# Patient Record
Sex: FEMALE | Race: WHITE | NOT HISPANIC OR LATINO | Employment: FULL TIME | URBAN - METROPOLITAN AREA
[De-identification: names, ages, dates, MRNs, and addresses within clinical notes are randomized per-mention and may not be internally consistent; named-entity substitution may affect disease eponyms.]

---

## 2017-12-14 ENCOUNTER — HISTORICAL (OUTPATIENT)
Dept: LAB | Facility: HOSPITAL | Age: 27
End: 2017-12-14

## 2017-12-14 LAB
ABS NEUT (OLG): 4.6 X10(3)/MCL (ref 1.5–6.9)
ALBUMIN SERPL-MCNC: 3.8 GM/DL (ref 3.4–5)
ALBUMIN/GLOB SERPL: 1.1 RATIO
ALP SERPL-CCNC: 71 UNIT/L (ref 30–113)
ALT SERPL-CCNC: 22 UNIT/L (ref 10–45)
AST SERPL-CCNC: 15 UNIT/L (ref 15–37)
BASOPHILS # BLD AUTO: 0 X10(3)/MCL (ref 0–0.1)
BASOPHILS NFR BLD AUTO: 0 % (ref 0–1)
BILIRUB SERPL-MCNC: 0.4 MG/DL (ref 0.1–0.9)
BILIRUBIN DIRECT+TOT PNL SERPL-MCNC: 0.1 MG/DL (ref 0–0.3)
BILIRUBIN DIRECT+TOT PNL SERPL-MCNC: 0.3 MG/DL
BUN SERPL-MCNC: 14 MG/DL (ref 10–20)
CALCIUM SERPL-MCNC: 8.9 MG/DL (ref 8–10.5)
CHLORIDE SERPL-SCNC: 104 MMOL/L (ref 100–108)
CHOLEST SERPL-MCNC: 148 MG/DL (ref 140–200)
CHOLEST/HDLC SERPL: 2 MG/DL (ref 0–8)
CO2 SERPL-SCNC: 26 MMOL/L (ref 21–35)
CREAT SERPL-MCNC: 0.64 MG/DL (ref 0.7–1.3)
EOSINOPHIL # BLD AUTO: 0.3 X10(3)/MCL (ref 0–0.6)
EOSINOPHIL NFR BLD AUTO: 4 % (ref 0–5)
ERYTHROCYTE [DISTWIDTH] IN BLOOD BY AUTOMATED COUNT: 12.9 % (ref 11.5–17)
GLOBULIN SER-MCNC: 3.4 GM/DL
GLUCOSE SERPL-MCNC: 89 MG/DL (ref 75–116)
HCT VFR BLD AUTO: 44 % (ref 36–48)
HDLC SERPL-MCNC: 60 MG/DL (ref 35–59)
HGB BLD-MCNC: 15.3 GM/DL (ref 12–16)
LDLC SERPL CALC-MCNC: 90 MG/DL (ref 100–129)
LYMPHOCYTES # BLD AUTO: 1.9 X10(3)/MCL (ref 0.5–4.1)
LYMPHOCYTES NFR BLD AUTO: 24.6 % (ref 15–40)
MAGNESIUM SERPL-MCNC: 1.9 MG/DL (ref 1.8–2.4)
MCH RBC QN AUTO: 33 PG (ref 27–34)
MCHC RBC AUTO-ENTMCNC: 35 GM/DL (ref 31–36)
MCV RBC AUTO: 95 FL (ref 80–99)
MONOCYTES # BLD AUTO: 0.7 X10(3)/MCL (ref 0–1.1)
MONOCYTES NFR BLD AUTO: 9 % (ref 4–12)
NEUTROPHILS # BLD AUTO: 4.6 X10(3)/MCL (ref 1.5–6.9)
NEUTROPHILS NFR BLD AUTO: 61 % (ref 43–75)
PLATELET # BLD AUTO: 243 X10(3)/MCL (ref 140–400)
PMV BLD AUTO: 10.3 FL (ref 6.8–10)
POTASSIUM SERPL-SCNC: 4 MMOL/L (ref 3.6–5.2)
PROT SERPL-MCNC: 7.2 GM/DL (ref 6.4–8.2)
RBC # BLD AUTO: 4.63 X10(6)/MCL (ref 4.2–5.4)
SODIUM SERPL-SCNC: 140 MMOL/L (ref 135–145)
T3FREE SERPL-MCNC: 2.94 PG/ML (ref 2.18–3.98)
TRIGL SERPL-MCNC: 53 MG/DL (ref 35–150)
TSH SERPL-ACNC: 2 MIU/ML (ref 0.36–3.74)
VLDLC SERPL CALC-MCNC: 11 MG/DL
WBC # SPEC AUTO: 7.6 X10(3)/MCL (ref 4.5–11.5)

## 2017-12-21 ENCOUNTER — HISTORICAL (OUTPATIENT)
Dept: RADIOLOGY | Facility: HOSPITAL | Age: 27
End: 2017-12-21

## 2018-02-13 ENCOUNTER — HOSPITAL ENCOUNTER (OUTPATIENT)
Dept: MEDSURG UNIT | Facility: HOSPITAL | Age: 28
End: 2018-02-14
Attending: INTERNAL MEDICINE | Admitting: INTERNAL MEDICINE

## 2018-02-13 LAB
ABS NEUT (OLG): 19.2
BASOPHILS # BLD AUTO: 0.03 X10(3)/MCL
BASOPHILS NFR BLD AUTO: 0.1 %
BUN SERPL-MCNC: 13 MG/DL (ref 7–18)
CALCIUM SERPL-MCNC: 8.4 MG/DL (ref 8.5–10.1)
CHLORIDE SERPL-SCNC: 109 MMOL/L (ref 98–107)
CO2 SERPL-SCNC: 26.9 MMOL/L (ref 21–32)
CREAT SERPL-MCNC: 0.74 MG/DL (ref 0.55–1.02)
EOSINOPHIL # BLD AUTO: 0.02 X10(3)/MCL
EOSINOPHIL NFR BLD AUTO: 0.1 %
ERYTHROCYTE [DISTWIDTH] IN BLOOD BY AUTOMATED COUNT: 13 %
GLUCOSE SERPL-MCNC: 112 MG/DL (ref 74–106)
HCT VFR BLD AUTO: 40.8 % (ref 34–46)
HGB BLD-MCNC: 14.6 GM/DL (ref 11.3–15.4)
IMM GRANULOCYTES # BLD AUTO: 0.1 10*3/UL (ref 0–0.1)
IMM GRANULOCYTES NFR BLD AUTO: 0.4 % (ref 0–1)
LYMPHOCYTES # BLD AUTO: 2.39 X10(3)/MCL
LYMPHOCYTES NFR BLD AUTO: 9.8 %
MAGNESIUM SERPL-MCNC: 2.3 MG/DL (ref 1.8–2.4)
MCH RBC QN AUTO: 33 PG (ref 27–33)
MCHC RBC AUTO-ENTMCNC: 35.8 GM/DL (ref 32–35)
MCV RBC AUTO: 92.1 FL (ref 81–97)
MONOCYTES # BLD AUTO: 2.56 X10(3)/MCL
MONOCYTES NFR BLD AUTO: 10.5 %
NEUTROPHILS # BLD AUTO: 19.2 X10(3)/MCL
NEUTROPHILS NFR BLD AUTO: 79.1 %
PHOSPHATE SERPL-MCNC: 2.6 MG/DL (ref 2.5–4.9)
PLATELET # BLD AUTO: 225 X10(3)/MCL (ref 151–368)
PMV BLD AUTO: 11 FL
POTASSIUM SERPL-SCNC: 3.8 MMOL/L (ref 3.5–5.1)
RBC # BLD AUTO: 4.43 X10(6)/MCL (ref 3.9–5)
SODIUM SERPL-SCNC: 145 MMOL/L (ref 136–145)
WBC # SPEC AUTO: 24.3 X10(3)/MCL (ref 3.4–9.2)

## 2018-02-14 LAB
ABS NEUT (OLG): 8.7
BASOPHILS # BLD AUTO: 0.03 X10(3)/MCL
BASOPHILS NFR BLD AUTO: 0.2 %
BUN SERPL-MCNC: 7 MG/DL (ref 7–18)
CALCIUM SERPL-MCNC: 8 MG/DL (ref 8.5–10.1)
CHLORIDE SERPL-SCNC: 108 MMOL/L (ref 98–107)
CO2 SERPL-SCNC: 25.7 MMOL/L (ref 21–32)
CREAT SERPL-MCNC: 0.68 MG/DL (ref 0.55–1.02)
EOSINOPHIL # BLD AUTO: 0.2 X10(3)/MCL
EOSINOPHIL NFR BLD AUTO: 1.6 %
ERYTHROCYTE [DISTWIDTH] IN BLOOD BY AUTOMATED COUNT: 13 %
GLUCOSE SERPL-MCNC: 89 MG/DL (ref 74–106)
HCT VFR BLD AUTO: 39.4 % (ref 34–46)
HGB BLD-MCNC: 13.7 GM/DL (ref 11.3–15.4)
IMM GRANULOCYTES # BLD AUTO: 0.04 10*3/UL (ref 0–0.1)
IMM GRANULOCYTES NFR BLD AUTO: 0.3 % (ref 0–1)
LYMPHOCYTES # BLD AUTO: 1.92 X10(3)/MCL
LYMPHOCYTES NFR BLD AUTO: 15.6 %
MAGNESIUM SERPL-MCNC: 1.9 MG/DL (ref 1.8–2.4)
MCH RBC QN AUTO: 32.8 PG (ref 27–33)
MCHC RBC AUTO-ENTMCNC: 34.8 GM/DL (ref 32–35)
MCV RBC AUTO: 94.3 FL (ref 81–97)
MONOCYTES # BLD AUTO: 1.39 X10(3)/MCL
MONOCYTES NFR BLD AUTO: 11.3 %
NEUTROPHILS # BLD AUTO: 8.7 X10(3)/MCL
NEUTROPHILS NFR BLD AUTO: 71 %
PLATELET # BLD AUTO: 163 X10(3)/MCL (ref 151–368)
PMV BLD AUTO: 11 FL
POTASSIUM SERPL-SCNC: 3 MMOL/L (ref 3.5–5.1)
RBC # BLD AUTO: 4.18 X10(6)/MCL (ref 3.9–5)
SODIUM SERPL-SCNC: 143 MMOL/L (ref 136–145)
WBC # SPEC AUTO: 12.28 X10(3)/MCL (ref 3.4–9.2)

## 2019-09-21 ENCOUNTER — HOSPITAL ENCOUNTER (OUTPATIENT)
Dept: ONCOLOGY | Facility: HOSPITAL | Age: 29
End: 2019-09-23
Attending: HOSPITALIST | Admitting: COLON & RECTAL SURGERY

## 2020-01-14 ENCOUNTER — HISTORICAL (OUTPATIENT)
Dept: LAB | Facility: HOSPITAL | Age: 30
End: 2020-01-14

## 2020-08-24 ENCOUNTER — HISTORICAL (OUTPATIENT)
Dept: LAB | Facility: HOSPITAL | Age: 30
End: 2020-08-24

## 2020-12-02 LAB
CHLAMYDIA NUCLEIC ACID SCREEN: NEGATIVE
GONOCOCCUS BY NUCLEIC ACID AMP: NEGATIVE
PAP RECOMMENDATION EXT: NORMAL
PAP SMEAR: NORMAL

## 2021-02-05 ENCOUNTER — HISTORICAL (OUTPATIENT)
Dept: LAB | Facility: HOSPITAL | Age: 31
End: 2021-02-05

## 2021-08-06 ENCOUNTER — HISTORICAL (OUTPATIENT)
Dept: ANESTHESIOLOGY | Facility: HOSPITAL | Age: 31
End: 2021-08-06

## 2021-08-27 ENCOUNTER — HISTORICAL (OUTPATIENT)
Dept: LAB | Facility: HOSPITAL | Age: 31
End: 2021-08-27

## 2021-09-15 ENCOUNTER — HISTORICAL (OUTPATIENT)
Dept: SURGERY | Facility: HOSPITAL | Age: 31
End: 2021-09-15

## 2021-10-13 ENCOUNTER — HISTORICAL (OUTPATIENT)
Dept: SURGERY | Facility: HOSPITAL | Age: 31
End: 2021-10-13

## 2021-10-14 ENCOUNTER — HISTORICAL (OUTPATIENT)
Dept: ANESTHESIOLOGY | Facility: HOSPITAL | Age: 31
End: 2021-10-14

## 2022-04-07 ENCOUNTER — HISTORICAL (OUTPATIENT)
Dept: ADMINISTRATIVE | Facility: HOSPITAL | Age: 32
End: 2022-04-07

## 2022-04-23 VITALS
DIASTOLIC BLOOD PRESSURE: 70 MMHG | WEIGHT: 164.88 LBS | OXYGEN SATURATION: 98 % | SYSTOLIC BLOOD PRESSURE: 116 MMHG | HEIGHT: 64 IN | BODY MASS INDEX: 28.15 KG/M2
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
## 2022-04-30 NOTE — DISCHARGE SUMMARY
Patient:   Ashley Clayton             MRN: [id number]            FIN: [id number]               Age:   28 years     Sex:  Female     :  1990   Associated Diagnoses:   Bipolar 1 disorder; GERD (gastroesophageal reflux disease); Methamphetamine use; Tobacco user; Acute hypokalemia; Hypomagnesemia; Intractable vomiting   Author:   Stanley Fernandez MD      Results Review   General results   Most recent results   Discrete results only   2018 5:10 CST       WBC                       12.28 x10(3)/mcL  HI                             RBC                       4.18 x10(6)/mcL                             Hgb                       13.7 gm/dL                             Hct                       39.4 %                             Platelet                  163 x10(3)/mcL                             MCV                       94.3 fL                             MCH                       32.8 pg                             MCHC                      34.8 gm/dL                             RDW                       13  NA                             MPV                       11  NA                             Abs Neut                  8.70  NA                             Neutro Auto               71.0 %  NA                             Lymph Auto                15.6 %  NA                             Mono Auto                 11.3 %  NA                             Eos Auto                  1.6 %  NA                             Abs Eos                   0.20 x10(3)/mcL  NA                             Basophil Auto             0.2 %  NA                             Abs Neutro                8.70 x10(3)/mcL  NA                             Abs Lymph                 1.92 x10(3)/mcL  NA                             Abs Mono                  1.39 x10(3)/mcL  NA                             Abs Baso                  0.03 x10(3)/mcL  NA                             IG%                       0.3 %                             IG#                        0.0400                             Sodium Lvl                143 mmol/L                             Potassium Lvl             3.0 mmol/L  LOW                             Chloride                  108 mmol/L  HI                             CO2                       25.7 mmol/L                             Calcium Lvl               8.0 mg/dL  LOW                             Magnesium Lvl             1.9 mg/dL                             Glucose Lvl               89 mg/dL                             BUN                       7 mg/dL                             Creatinine                0.68 mg/dL                             eGFR-AA                   >60 mL/min/1.73 m2  NA                             eGFR-RAMY                  >60 mL/min/1.73 m2  NA           Discharge Information      Discharge Summary Information   Admitted  2/13/2018   Discharged  2/14/2018   Admitting physician     Delvis AVALOS MD, Arcenio Moya.     Discharge diagnosis     Bipolar 1 disorder (LLS23-RX F31.9).     GERD (gastroesophageal reflux disease) (ODO38-NU K21.9).     Methamphetamine use (ZJG37-WF F15.10).     Tobacco user (JKL33-KP Z72.0).     Discharge medications     OTHER MEDICATIONS (Selected)   Inpatient Medications  Ordered  Carafate: 1 gm, form: Susp, Oral, AC & HS, first dose 02/13/18 3:15:00 CST  Ceftriaxone 1 gm/D5W 50 mL (Premix): 1 gm, form: Infusion, IV Piggyback, q24hr, Infuse over: 30 minute(s), first dose 02/13/18 21:00:00 CST  Chloraseptic sore throat spray: 2 spray(s), form: Spray, TOP, q1hr PRN for sore throat, first dose 02/13/18 16:16:00 CST, **Waste Code BKC**  Normal Saline (0.9% NS) IV 1,000 mL: 1,000 mL, 1,000 mL, IV, 100 mL/hr, start date 02/13/18 4:47:00 CST  PROzac: 40 mg, form: Cap, Oral, Daily, first dose 02/13/18 9:00:00 CST  Protonix: 40 mg, form: Injection, IV Slow, BID, Infuse over: 30 minute(s), first dose 02/13/18 9:00:00 CST  QUEtiapine 200 mg oral tablet: 200 mg, form: Tab, Oral, qPM,  first dose 02/13/18 21:00:00 CST  Zofran 2 mg/mL injectable solution: 2 mg, form: Injection, IV Push, q4hr PRN for nausea, first dose 02/13/18 4:27:00 CST  Documented Medications  Documented  HYDROXYZ HCL 25MG TAB:   PROzac 40 mg oral capsule: 40 mg = 1 cap(s), Oral, Daily, 0 Refill(s)  QUETIAPINE 200MG TAB: 200 mg = 1 tab(s), Oral, qPM  SUMATRIPTAN 50MG TAB: 50 mg = 1 tab(s), Oral, As Indicated.        Physical Examination   General:  Alert and oriented, No acute distress.    Eye:  Pupils are equal, round and reactive to light, Extraocular movements are intact, Normal conjunctiva.    HENT:  Normocephalic, Tympanic membranes are clear.    Neck:  Supple, Non-tender, No carotid bruit, No jugular venous distention, No lymphadenopathy, No thyromegaly.    Respiratory:  Lungs are clear to auscultation, Breath sounds are equal, Symmetrical chest wall expansion, No chest wall tenderness.    Cardiovascular:  Normal rate, Regular rhythm, No murmur, No gallop, No edema.    Gastrointestinal:  Soft, Non-tender, Non-distended, Normal bowel sounds, No organomegaly.    Genitourinary:  No costovertebral angle tenderness.    Vital Signs   2/14/2018 12:59 CST      24 HR Intake Totals       12 mL                             24 HR Output Totals       0 mL                             24 HR I&O Balance         12 mL    2/14/2018 12:00 CST      Temperature Oral          36.5 DegC                             Temperature Oral (calculated)             97.70 DegF                             Peripheral Pulse Rate     62 bpm                             Respiratory Rate          18 br/min                             SpO2                      100 %                             Systolic Blood Pressure   147 mmHg  HI                             Diastolic Blood Pressure  97 mmHg  HI                             Mean Arterial Pressure, Cuff              114 mmHg    2/14/2018 10:00 CST      Respiratory Rate          20 br/min                              SpO2                      99 %    2/14/2018 8:59 CST       24 HR Intake Totals       2 mL                             24 HR Output Totals       0 mL                             24 HR I&O Balance         2 mL    2/14/2018 8:00 CST       Temperature Oral          36.8 DegC                             Temperature Oral (calculated)             98.24 DegF                             Peripheral Pulse Rate     71 bpm                             Respiratory Rate          20 br/min                             SpO2                      99 %                             Systolic Blood Pressure   122 mmHg                             Diastolic Blood Pressure  91 mmHg  HI                             Mean Arterial Pressure, Cuff              101 mmHg    2/14/2018 6:59 CST       24 HR Intake Totals       3,742 mL                             24 HR Output Totals       1,300 mL                             24 HR I&O Balance         2,442 mL    2/14/2018 4:59 CST       24 HR Intake Totals       1,702 mL                             24 HR Output Totals       700 mL                             24 HR I&O Balance         1,002 mL    2/14/2018 4:00 CST       Temperature Oral          36.8 DegC                             Temperature Oral (calculated)             98.24 DegF                             Peripheral Pulse Rate     80 bpm                             Respiratory Rate          20 br/min                             SpO2                      96 %                             Oxygen Therapy            Room air                             Systolic Blood Pressure   111 mmHg                             Diastolic Blood Pressure  74 mmHg                             Mean Arterial Pressure, Cuff              86 mmHg    2/14/2018 2:59 CST       24 HR Intake Totals       1,702 mL                             24 HR Output Totals       700 mL                             24 HR I&O Balance         1,002 mL    2/14/2018 0:59 CST       24 HR Intake  Totals       1,702 mL                             24 HR Output Totals       700 mL                             24 HR I&O Balance         1,002 mL    2/14/2018 0:00 CST       Temperature Oral          37 DegC                             Temperature Oral (calculated)             98.60 DegF                             Peripheral Pulse Rate     88 bpm                             Respiratory Rate          18 br/min                             SpO2                      98 %                             Oxygen Therapy            Room air                             Systolic Blood Pressure   115 mmHg                             Diastolic Blood Pressure  69 mmHg                             Mean Arterial Pressure, Cuff              84 mmHg    2/13/2018 22:59 CST      24 HR Intake Totals       1,702 mL                             24 HR Output Totals       700 mL                             24 HR I&O Balance         1,002 mL    2/13/2018 20:59 CST      24 HR Intake Totals       1,072 mL                             24 HR Output Totals       0 mL                             24 HR I&O Balance         1,072 mL    2/13/2018 20:00 CST      Temperature Oral          37.0 DegC                             Temperature Oral (calculated)             98.60 DegF                             Peripheral Pulse Rate     86 bpm                             Respiratory Rate          20 br/min                             SpO2                      99 %                             Systolic Blood Pressure   110 mmHg                             Diastolic Blood Pressure  69 mmHg                             Mean Arterial Pressure, Cuff              83 mmHg    2/13/2018 15:00 CST      Temperature Oral          36.8 DegC                             Temperature Oral (calculated)             98.24 DegF                             Peripheral Pulse Rate     99 bpm                             Respiratory Rate          20 br/min                             SpO2                       99 %                             Oxygen Therapy            Room air                             Systolic Blood Pressure   114 mmHg                             Diastolic Blood Pressure  77 mmHg    2/13/2018 11:00 CST      Temperature Oral          36.8 DegC                             Temperature Oral (calculated)             98.24 DegF                             Peripheral Pulse Rate     88 bpm                             Respiratory Rate          22 br/min                             SpO2                      100 %                             Oxygen Therapy            Room air                             Systolic Blood Pressure   130 mmHg                             Diastolic Blood Pressure  89 mmHg    2/13/2018 8:00 CST       Temperature Oral          36.9 DegC                             Temperature Oral (calculated)             98.42 DegF                             Peripheral Pulse Rate     95 bpm                             Respiratory Rate          22 br/min                             SpO2                      100 %                             Oxygen Therapy            Room air                             Systolic Blood Pressure   141 mmHg  HI                             Diastolic Blood Pressure  90 mmHg    2/13/2018 4:59 CST       24 HR Intake Totals       110 mL                             24 HR Output Totals       0 mL                             24 HR I&O Balance         110 mL    2/13/2018 4:00 CST       Temperature Oral          37 DegC                             Temperature Oral (calculated)             98.60 DegF                             Peripheral Pulse Rate     98 bpm                             Respiratory Rate          20 br/min                             SpO2                      99 %                             Oxygen Therapy            Room air                             Systolic Blood Pressure   104 mmHg                             Diastolic Blood Pressure  73 mmHg                              Mean Arterial Pressure, Cuff              83 mmHg    2/13/2018 2:59 CST       24 HR Intake Totals       0 mL                             24 HR Output Totals       0 mL                             24 HR I&O Balance         0 mL    2/13/2018 2:14 CST       Temperature Oral          37.2 DegC                             Temperature Oral (calculated)             98.96 DegF                             Peripheral Pulse Rate     95 bpm                             Respiratory Rate          22 br/min                             SpO2                      100 %                             Oxygen Therapy            Room air                             Systolic Blood Pressure   117 mmHg                             Diastolic Blood Pressure  87 mmHg                             Mean Arterial Pressure, Cuff              97 mmHg                             Mean Arterial Pressure, Cuff              97 mmHg    2/13/2018 1:43 CST       Peripheral Pulse Rate     90 bpm                             Respiratory Rate          20 br/min                             SpO2                      100 %                             Systolic Blood Pressure   134 mmHg                             Diastolic Blood Pressure  90 mmHg        Vital Signs (last 24 hrs)_____  Last Charted___________  Temp Oral     36.5 DegC  (FEB 14 12:00)  Heart Rate Peripheral   62 bpm  (FEB 14 12:00)  Resp Rate         18 br/min  (FEB 14 12:00)  SBP      H 147mmHg  (FEB 14 12:00)  DBP      H 97mmHg  (FEB 14 12:00)  SpO2      100 %  (FEB 14 12:00)     Lymphatics:  No lymphadenopathy neck, axilla, groin.    Musculoskeletal:  Normal range of motion, Normal strength, No swelling.    Integumentary:  Warm.    Neurologic:  Alert, Oriented, Normal motor function, No focal deficits, Cranial Nerves II-XII are grossly intact, Normal deep tendon reflexes.    Psychiatric:  Cooperative, Non-suicidal.       Hospital Course   Hospital Course   Admitted from: from  emergency department.     Transferred via: by car.     Admitting diagnosis: Acute hypokalemia (WMI57-PP E87.6), Bipolar 1 disorder (LXO70-LC F31.9), GERD (gastroesophageal reflux disease) (VPE31-JM K21.9), Hypomagnesemia (MNC89-YZ E83.42), Intractable vomiting (OTK00-JK R11.10), Methamphetamine use (FTY92-PH F15.10), Tobacco user (RJN64-PW Z72.0).     Admission disposition: admit to medical bed.       HPI:27yo female presents to the ED c/o N/V of 1 days duration.  Apparently she is coming off synthetic weed an dmay be having some withdrawals.  She states that she also snorted a line of Meth last night which made her feel worse.  She came in and was found to have an elevated WBC of 25.15.  She was afebrile.  Her bp was also accelerated but has been stable since.  No diarrhea.  She is c/o sore throat from vomiting.  CT scan did not show any acute pathology          Hospital course: 29 y/o wf admitted with a dx of polysubstance abuse , nausea and dehydration . She was started on IVF and IVAB .  The urine cx NGTD .  The wbc went down almost to normal level  .  Her sx resolved with previous tx .  She had a PMHx od bipolar  and depression , She states feelin g depress . She was evaluated by a  psych nurse from [facility] psych unit  which  feel if safe to go home and f/u oupt  therapy . She denies any SI/VH/AH . She  does not look dangerous to her selve or others  at this time .  She is willing  to go to  outpt rehab  . Pt was  seen and examined at bedside . She was determined to be suitable for d/c .        D/C summary time :> 35 min       Discharge Plan   Discharge Summary Plan   Discharge Status: improved.     Discharge instructions given: to patient.     Discharge disposition: discharge to home (into the care of family member, self care).     Prescriptions: continue same medications.     Education and Follow-up   Counseled: patient, regarding diagnosis, regarding treatment, regarding medications.     Discharge Planning:  Farhana Osorio In 1 week; Report to Emergency Department if symptoms return or worsen; Anytime the conditions worsen, return to clinic or go to ED; Stanley Lloyd.

## 2022-04-30 NOTE — OP NOTE
ADMITTING DIAGNOSIS:  Left carpal tunnel syndrome.    PROCEDURE:  Left carpal tunnel release.    POSTOPERATIVE DIAGNOSIS:  Successful left carpal tunnel release.    INDICATIONS:  The patient is a 31-year-old  female with bipolar disorder, anxiety, depression, who has hyperhomocysteinemia, along with asthma and hypoglycemia.  Patient recently had carpal tunnel syndrome, both hands.  Left side was worse than the right.  EMG verified the right is greater than left.  She has tried splints and steroids and wanted the left side done initially with the right side to follow.    DESCRIPTION OF PROCEDURE:  The patient was brought to the OR.  Local and IV sedation were given, and patient underwent incision along the palmar crease and exposure of the skin, subcutaneous tissue, and carpal fascia.  The carpal fascia was opened.  The tunnel was released, and the median nerve was completely exposed.  The patient then underwent closure of the skin and subcu with 4-0 plain gut suture and 4-0 nylon.  Sterile dressings were applied with Neosporin.  No problems were encountered.  Tourniquet was let down.  Good hemostasis was achieved.       I appreciate the consultation referral from Ms. Farhana Osorio.        LESLY/BERENICE   DD: 08/06/2021 1152   DT: 08/06/2021 1158  Job # [id number]    cc: Farhana Osorio
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
## 2022-04-30 NOTE — OP NOTE
ADMITTING DIAGNOSIS:  Right carpal tunnel syndrome.    PROCEDURE:  Right carpal tunnel release.    POSTOPERATIVE DIAGNOSIS:  Successful right carpal tunnel release.    BRIEF HISTORY:  Patient is a 31-year-old  female with bipolar disorder, anxiety, depressive disorders, has hyperhomocysteinemia and asthma as well as hyperglycemia.  Patient recently had a carpal tunnel syndrome, both right and left hands.  Left side was done initially.  She did well from it on 08/06/2021.  Now, wants the right hand done.    DESCRIPTION OF PROCEDURE:  She was brought to the OR.  A tourniquet and Esmarch were applied with prepping and draping.  She had local and IV sedation given and then had an incision made along the palmar crease with dissection through skin, subcutaneous tissue, and palmar fascia was opened over the median nerve.  The median nerve was fully exposed with a 15 blade scalpel.  Good hemostasis was achieved with Bovie cautery along the skin edge.  Patient had reapproximation of the skin and subcu with 4-0 nylon and 4-0 plain gut suture.  Sterile dressings were applied.  No problems were encountered.  Patient tolerated procedure well.  Tourniquet was released.  I appreciate the consultation referral from nurse practitioner, Ms. Farhana Osorio and will notify her my findings.        LESLY/BERENICE   DD: 10/14/2021 1331   DT: 10/14/2021 1340  Job # [id number]    cc: Farhana Osorio, Nurse Practitioner
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
## 2022-06-07 DIAGNOSIS — S52.509A CLOSED FRACTURE OF DISTAL END OF RADIUS, UNSPECIFIED FRACTURE MORPHOLOGY, UNSPECIFIED LATERALITY, INITIAL ENCOUNTER: Primary | ICD-10-CM

## 2023-01-31 ENCOUNTER — DOCUMENTATION ONLY (OUTPATIENT)
Dept: ADMINISTRATIVE | Facility: HOSPITAL | Age: 33
End: 2023-01-31
Payer: MEDICAID

## 2023-03-22 ENCOUNTER — LAB VISIT (OUTPATIENT)
Dept: LAB | Facility: HOSPITAL | Age: 33
End: 2023-03-22
Attending: NURSE PRACTITIONER
Payer: MEDICAID

## 2023-03-22 DIAGNOSIS — I51.9 MYXEDEMA HEART DISEASE: ICD-10-CM

## 2023-03-22 DIAGNOSIS — E16.1 IATROGENIC HYPERINSULINISM: Primary | ICD-10-CM

## 2023-03-22 DIAGNOSIS — E03.9 MYXEDEMA HEART DISEASE: ICD-10-CM

## 2023-03-22 LAB
GLUCOSE 1H P 100 G GLC PO SERPL-MCNC: 144 MG/DL (ref 74–100)
GLUCOSE 2H P 100 G GLC PO SERPL-MCNC: 146 MG/DL (ref 74–100)
GLUCOSE 3H P 100 G GLC PO SERPL-MCNC: 88 MG/DL (ref 74–100)
GLUCOSE P FAST SERPL-MCNC: 92 MG/DL (ref 74–100)
T3FREE SERPL-MCNC: 3.23 PG/ML (ref 1.57–3.91)
T4 FREE SERPL-MCNC: 1.05 NG/DL (ref 0.7–1.48)
TSH SERPL-ACNC: 1.17 UIU/ML (ref 0.35–4.94)

## 2023-03-22 PROCEDURE — 36415 COLL VENOUS BLD VENIPUNCTURE: CPT

## 2023-03-22 PROCEDURE — 84443 ASSAY THYROID STIM HORMONE: CPT

## 2023-03-22 PROCEDURE — 84481 FREE ASSAY (FT-3): CPT

## 2023-03-22 PROCEDURE — 82951 GLUCOSE TOLERANCE TEST (GTT): CPT

## 2023-03-22 PROCEDURE — 82947 ASSAY GLUCOSE BLOOD QUANT: CPT

## 2023-03-22 PROCEDURE — 84439 ASSAY OF FREE THYROXINE: CPT

## 2023-03-22 PROCEDURE — 86376 MICROSOMAL ANTIBODY EACH: CPT

## 2023-03-22 PROCEDURE — 82950 GLUCOSE TEST: CPT

## 2023-03-23 LAB
THYROID PEROXIDASE (OLG): NEGATIVE
THYROID PEROXIDASE QUANT (OLG): <4 IU/ML

## 2023-03-26 ENCOUNTER — HOSPITAL ENCOUNTER (EMERGENCY)
Facility: HOSPITAL | Age: 33
Discharge: HOME | End: 2023-03-27
Attending: INTERNAL MEDICINE
Payer: MEDICAID

## 2023-03-26 DIAGNOSIS — T22.111A SUPERFICIAL BURN OF RIGHT FOREARM, INITIAL ENCOUNTER: Primary | ICD-10-CM

## 2023-03-26 PROCEDURE — 99284 EMERGENCY DEPT VISIT MOD MDM: CPT

## 2023-03-26 RX ORDER — IBUPROFEN 400 MG/1
800 TABLET ORAL
Status: COMPLETED | OUTPATIENT
Start: 2023-03-27 | End: 2023-03-26

## 2023-03-26 RX ORDER — HYDROCODONE BITARTRATE AND ACETAMINOPHEN 5; 325 MG/1; MG/1
1 TABLET ORAL EVERY 6 HOURS PRN
Qty: 12 TABLET | Refills: 0 | Status: SHIPPED | OUTPATIENT
Start: 2023-03-26 | End: 2023-03-29

## 2023-03-26 RX ORDER — SILVER SULFADIAZINE 10 G/1000G
1 CREAM TOPICAL
Status: COMPLETED | OUTPATIENT
Start: 2023-03-27 | End: 2023-03-26

## 2023-03-26 RX ORDER — SILVER SULFADIAZINE 10 G/1000G
CREAM TOPICAL 2 TIMES DAILY
Qty: 400 G | Refills: 0 | Status: SHIPPED | OUTPATIENT
Start: 2023-03-26 | End: 2023-04-09

## 2023-03-26 RX ADMIN — SILVER SULFADIAZINE 1 TUBE: 10 CREAM TOPICAL at 11:03

## 2023-03-26 RX ADMIN — IBUPROFEN 800 MG: 400 TABLET, FILM COATED ORAL at 11:03

## 2023-03-27 VITALS
DIASTOLIC BLOOD PRESSURE: 63 MMHG | SYSTOLIC BLOOD PRESSURE: 120 MMHG | WEIGHT: 184.75 LBS | BODY MASS INDEX: 32.73 KG/M2 | OXYGEN SATURATION: 97 % | HEIGHT: 63 IN | HEART RATE: 98 BPM | TEMPERATURE: 98 F | RESPIRATION RATE: 18 BRPM

## 2023-03-27 PROCEDURE — 25000003 PHARM REV CODE 250: Performed by: NURSE PRACTITIONER
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
## 2023-05-03 ENCOUNTER — HOSPITAL ENCOUNTER (EMERGENCY)
Facility: HOSPITAL | Age: 33
Discharge: HOME | End: 2023-05-03
Attending: EMERGENCY MEDICINE
Payer: MEDICAID

## 2023-05-03 VITALS
BODY MASS INDEX: 30.56 KG/M2 | HEIGHT: 64 IN | DIASTOLIC BLOOD PRESSURE: 79 MMHG | RESPIRATION RATE: 19 BRPM | SYSTOLIC BLOOD PRESSURE: 118 MMHG | OXYGEN SATURATION: 99 % | WEIGHT: 179 LBS | TEMPERATURE: 98 F | HEART RATE: 110 BPM

## 2023-05-03 DIAGNOSIS — R19.7 NAUSEA VOMITING AND DIARRHEA: Primary | ICD-10-CM

## 2023-05-03 DIAGNOSIS — E86.0 DEHYDRATION, MODERATE: ICD-10-CM

## 2023-05-03 DIAGNOSIS — R11.2 NAUSEA VOMITING AND DIARRHEA: Primary | ICD-10-CM

## 2023-05-03 DIAGNOSIS — F19.10 POLYSUBSTANCE ABUSE: ICD-10-CM

## 2023-05-03 DIAGNOSIS — F31.9 BIPOLAR AFFECTIVE DISORDER, REMISSION STATUS UNSPECIFIED: ICD-10-CM

## 2023-05-03 LAB
ALBUMIN SERPL-MCNC: 3.7 G/DL (ref 3.5–5)
ALBUMIN/GLOB SERPL: 1.5 RATIO (ref 1.1–2)
ALP SERPL-CCNC: 60 UNIT/L (ref 40–150)
ALT SERPL-CCNC: 20 UNIT/L (ref 0–55)
APPEARANCE UR: CLEAR
AST SERPL-CCNC: 15 UNIT/L (ref 5–34)
B-HCG SERPL QL: NEGATIVE
BACTERIA #/AREA URNS AUTO: ABNORMAL /HPF
BASOPHILS # BLD AUTO: 0.03 X10(3)/MCL
BASOPHILS NFR BLD AUTO: 0.2 %
BILIRUB UR QL STRIP.AUTO: NEGATIVE MG/DL
BILIRUBIN DIRECT+TOT PNL SERPL-MCNC: 0.8 MG/DL
BUN SERPL-MCNC: 18 MG/DL (ref 7–18.7)
CALCIUM SERPL-MCNC: 8.8 MG/DL (ref 8.4–10.2)
CHLORIDE SERPL-SCNC: 99 MMOL/L (ref 98–107)
CO2 SERPL-SCNC: 23 MMOL/L (ref 22–29)
COLOR UR AUTO: YELLOW
CREAT SERPL-MCNC: 0.83 MG/DL (ref 0.55–1.02)
EOSINOPHIL # BLD AUTO: 0.04 X10(3)/MCL (ref 0–0.9)
EOSINOPHIL NFR BLD AUTO: 0.3 %
ERYTHROCYTE [DISTWIDTH] IN BLOOD BY AUTOMATED COUNT: 12.4 % (ref 11.5–17)
GFR SERPLBLD CREATININE-BSD FMLA CKD-EPI: >60 MLS/MIN/1.73/M2
GLOBULIN SER-MCNC: 2.5 GM/DL (ref 2.4–3.5)
GLUCOSE SERPL-MCNC: 150 MG/DL (ref 74–100)
GLUCOSE UR QL STRIP.AUTO: NEGATIVE MG/DL
HCT VFR BLD AUTO: 49.7 % (ref 37–47)
HGB BLD-MCNC: 17.4 G/DL (ref 12–16)
IMM GRANULOCYTES # BLD AUTO: 0.06 X10(3)/MCL (ref 0–0.04)
IMM GRANULOCYTES NFR BLD AUTO: 0.5 %
KETONES UR QL STRIP.AUTO: NEGATIVE MG/DL
LEUKOCYTE ESTERASE UR QL STRIP.AUTO: NEGATIVE UNIT/L
LIPASE SERPL-CCNC: 7 U/L
LYMPHOCYTES # BLD AUTO: 0.49 X10(3)/MCL (ref 0.6–4.6)
LYMPHOCYTES NFR BLD AUTO: 3.8 %
MCH RBC QN AUTO: 33.7 PG (ref 27–31)
MCHC RBC AUTO-ENTMCNC: 35 G/DL (ref 33–36)
MCV RBC AUTO: 96.3 FL (ref 80–94)
MONOCYTES # BLD AUTO: 0.55 X10(3)/MCL (ref 0.1–1.3)
MONOCYTES NFR BLD AUTO: 4.2 %
NEUTROPHILS # BLD AUTO: 11.82 X10(3)/MCL (ref 2.1–9.2)
NEUTROPHILS NFR BLD AUTO: 91 %
NITRITE UR QL STRIP.AUTO: NEGATIVE
PH UR STRIP.AUTO: 5.5 [PH]
PLATELET # BLD AUTO: 191 X10(3)/MCL (ref 130–400)
PMV BLD AUTO: 10.6 FL (ref 7.4–10.4)
POCT GLUCOSE: 195 MG/DL (ref 70–110)
POTASSIUM SERPL-SCNC: 3.5 MMOL/L (ref 3.5–5.1)
PROT SERPL-MCNC: 6.2 GM/DL (ref 6.4–8.3)
PROT UR QL STRIP.AUTO: NEGATIVE MG/DL
RBC # BLD AUTO: 5.16 X10(6)/MCL (ref 4.2–5.4)
RBC #/AREA URNS AUTO: ABNORMAL /HPF
RBC UR QL AUTO: ABNORMAL UNIT/L
SODIUM SERPL-SCNC: 131 MMOL/L (ref 136–145)
SP GR UR STRIP.AUTO: >=1.03
SQUAMOUS #/AREA URNS AUTO: ABNORMAL /HPF
UROBILINOGEN UR STRIP-ACNC: 0.2 MG/DL
WBC # SPEC AUTO: 12.99 X10(3)/MCL (ref 4.5–11.5)
WBC #/AREA URNS AUTO: ABNORMAL /HPF

## 2023-05-03 PROCEDURE — 63600175 PHARM REV CODE 636 W HCPCS: Performed by: EMERGENCY MEDICINE

## 2023-05-03 PROCEDURE — 85025 COMPLETE CBC W/AUTO DIFF WBC: CPT | Performed by: EMERGENCY MEDICINE

## 2023-05-03 PROCEDURE — 82962 GLUCOSE BLOOD TEST: CPT

## 2023-05-03 PROCEDURE — 80053 COMPREHEN METABOLIC PANEL: CPT | Performed by: EMERGENCY MEDICINE

## 2023-05-03 PROCEDURE — 25000003 PHARM REV CODE 250: Performed by: EMERGENCY MEDICINE

## 2023-05-03 PROCEDURE — 96361 HYDRATE IV INFUSION ADD-ON: CPT

## 2023-05-03 PROCEDURE — 96374 THER/PROPH/DIAG INJ IV PUSH: CPT

## 2023-05-03 PROCEDURE — 99284 EMERGENCY DEPT VISIT MOD MDM: CPT

## 2023-05-03 PROCEDURE — 81001 URINALYSIS AUTO W/SCOPE: CPT | Performed by: EMERGENCY MEDICINE

## 2023-05-03 PROCEDURE — 81025 URINE PREGNANCY TEST: CPT | Performed by: EMERGENCY MEDICINE

## 2023-05-03 PROCEDURE — 83690 ASSAY OF LIPASE: CPT | Performed by: EMERGENCY MEDICINE

## 2023-05-03 RX ORDER — ONDANSETRON 2 MG/ML
4 INJECTION INTRAMUSCULAR; INTRAVENOUS
Status: COMPLETED | OUTPATIENT
Start: 2023-05-03 | End: 2023-05-03

## 2023-05-03 RX ORDER — ONDANSETRON 4 MG/1
4 TABLET, ORALLY DISINTEGRATING ORAL EVERY 6 HOURS PRN
Qty: 16 TABLET | Refills: 0 | Status: SHIPPED | OUTPATIENT
Start: 2023-05-03

## 2023-05-03 RX ADMIN — SODIUM CHLORIDE, POTASSIUM CHLORIDE, SODIUM LACTATE AND CALCIUM CHLORIDE 1000 ML: 600; 310; 30; 20 INJECTION, SOLUTION INTRAVENOUS at 09:05

## 2023-05-03 RX ADMIN — ONDANSETRON 4 MG: 2 INJECTION INTRAMUSCULAR; INTRAVENOUS at 08:05

## 2023-05-03 RX ADMIN — SODIUM CHLORIDE 2000 ML: 9 INJECTION, SOLUTION INTRAVENOUS at 08:05

## 2023-05-03 NOTE — ED PROVIDER NOTES
Encounter Date: 5/3/2023       History     Chief Complaint   Patient presents with    Abdominal Pain     Pt c/o vomiting and diarrhea with body aches since last night.     Patient complains of sudden onset of midnight of nausea vomiting and diarrhea no coffee-ground emesis no black tarry stool nothing that looks bloody coming out of either end she states.  No severe pain with it.  Lightheaded and dizzy but no syncope.  Past medical history is significant for ongoing tobacco use.  She is a regular methamphetamine user.  In last night she said she slipped up and did some crack cocaine.  She does not drink alcohol.  She has a history of bipolar disorder gastroesophageal reflux disease diabetes mellitus on metformin and hypothyroidism.  She denies any pain associated with this event this morning.  No chest pain no abdominal pain.  She denies passing out.      She is had her COVID shots she is had a flu shot in  she is had tetanus shot in 2019 she is not had any pneumonia vaccines.      Past surgical history cholecystectomy tonsillectomy adenoidectomy 1 surgery for ruptured ectopic pregnancy.      She is not had a cycle in 3 years she was on Depo-Provera shots her last injection was 6 months ago she has not started bleeding yet.  Her OBGYN doctors none current lead.  Farhanabal Osorio is her primary healthcare provider.   1 para 0 ectopic pregnancy 1.  She is employed she is single and lives alone.    Her mother is  with heart disease.  Her dad is alive he has diverticulitis.    Review of patient's allergies indicates:   Allergen Reactions    Latex, natural rubber Rash     Past Medical History:   Diagnosis Date    Diabetes mellitus      History reviewed. No pertinent surgical history.  History reviewed. No pertinent family history.     Review of Systems   Constitutional:  Positive for chills.   HENT: Negative.     Eyes: Negative.    Respiratory: Negative.     Cardiovascular: Negative.    Gastrointestinal:   Positive for diarrhea, nausea and vomiting (Onset midnight). Negative for anal bleeding.   Endocrine: Negative.    Genitourinary: Negative.    Musculoskeletal: Negative.    Skin: Negative.    Allergic/Immunologic: Negative.    Neurological: Negative.    Hematological: Negative.    Psychiatric/Behavioral:          Methamphetamine abuse on a regular basis cocaine crack use last night.  Chronic bipolar disorder.   All other systems reviewed and are negative.    Physical Exam     Initial Vitals [05/03/23 0800]   BP Pulse Resp Temp SpO2   114/81 (!) 130 18 98.4 °F (36.9 °C) 95 %      MAP       --         Physical Exam    Nursing note and vitals reviewed.  Constitutional: She appears well-developed and well-nourished.   A lady who looks concerned but is not toxic appearing she is fully awake and oriented tachycardia is noted   HENT:   Head: Normocephalic and atraumatic.   Right Ear: Tympanic membrane and external ear normal.   Left Ear: Tympanic membrane and external ear normal.   Nose: Nose normal.   Mouth/Throat: Oropharynx is clear and moist and mucous membranes are normal.   Eyes: EOM are normal. Pupils are equal, round, and reactive to light.   Neck: Neck supple. No thyromegaly present. No tracheal deviation present. No JVD present.   Normal range of motion.  Cardiovascular:  Normal rate, regular rhythm and normal heart sounds.     Exam reveals no gallop and no friction rub.       No murmur heard.  Pulmonary/Chest: Breath sounds normal. No stridor. No respiratory distress. She has no wheezes. She has no rhonchi. She has no rales. She exhibits no tenderness.   Abdominal: Abdomen is soft. Bowel sounds are normal. She exhibits no distension and no mass. There is no abdominal tenderness.   Good bowel sounds all quadrants no masses no rebound 2+ femoral pulses   Musculoskeletal:         General: No tenderness or edema. Normal range of motion.      Cervical back: Normal range of motion and neck supple.     Lymphadenopathy:      She has no cervical adenopathy.   Neurological: She is alert and oriented to person, place, and time. She has normal strength and normal reflexes. No cranial nerve deficit. GCS score is 15. GCS eye subscore is 4. GCS verbal subscore is 5. GCS motor subscore is 6.   Skin: Skin is warm and dry. Capillary refill takes 2 to 3 seconds. No rash noted.   Psychiatric: She has a normal mood and affect. Her behavior is normal. Judgment and thought content normal.   Substance abuse methamphetamines on a regular basis and cocaine crack type last night.       ED Course   Procedures  Labs Reviewed   COMPREHENSIVE METABOLIC PANEL - Abnormal; Notable for the following components:       Result Value    Sodium Level 131 (*)     Glucose Level 150 (*)     Protein Total 6.2 (*)     All other components within normal limits   URINALYSIS, REFLEX TO URINE CULTURE - Abnormal; Notable for the following components:    Blood, UA Trace-Intact (*)     All other components within normal limits   CBC WITH DIFFERENTIAL - Abnormal; Notable for the following components:    WBC 12.99 (*)     Hgb 17.4 (*)     Hct 49.7 (*)     MCV 96.3 (*)     MCH 33.7 (*)     MPV 10.6 (*)     Lymph # 0.49 (*)     Neut # 11.82 (*)     IG# 0.06 (*)     All other components within normal limits   URINALYSIS, MICROSCOPIC - Abnormal; Notable for the following components:    Bacteria, UA Few (*)     Squamous Epithelial Cells, UA Few (*)     All other components within normal limits   POCT GLUCOSE - Abnormal; Notable for the following components:    POCT Glucose 195 (*)     All other components within normal limits   LIPASE - Normal   HCG QUALITATIVE URINE - Normal   CBC W/ AUTO DIFFERENTIAL    Narrative:     The following orders were created for panel order CBC W/ AUTO DIFFERENTIAL.  Procedure                               Abnormality         Status                     ---------                               -----------         ------                     CBC with  Differential[[id number]]        Abnormal            Final result                 Please view results for these tests on the individual orders.          Imaging Results    None          Medications   lactated ringers bolus 1,000 mL (has no administration in time range)   sodium chloride 0.9% bolus 2,000 mL 2,000 mL (2,000 mLs Intravenous New Bag 5/3/23 0828)   ondansetron injection 4 mg (4 mg Intravenous Given 5/3/23 0829)     Medical Decision Making:   Initial Assessment:   Nausea vomiting diarrhea onset at midnight chronic methamphetamine abuse.  Crack cocaine use last night.  Bipolar disorder.  Hypothyroidism diabetes.  Gastroesophageal reflux disease.  Pleasant awake oriented good historian normocephalic  Atraumatic mucous membranes still moist heart is tachycardic without murmur gallop or rub  Lungs are clear to auscultation and percussion  Abdomen is benign with good bowel sounds no masses no rebound nontender abdomen  Psych she is appropriate nonsuicidal non homicidal neurological is appropriate no focal deficits  Differential Diagnosis:   Gastroenteritis with nausea vomiting and diarrhea, polysubstance abuse with secondary side effects, dehydration, mild, bipolar disorder,  MDM  Problems addressed  Co-morbidities and/or factors adding to the complexity or risk for the patient:  Polysubstance abuse bipolar disorder diabetes mellitus hypothyroidism  Problems addressed:  Nausea vomiting diarrhea onset 8 hours prior to arrival  Acute problem/illness or progression/exacerbation of chronic problem with potential threat to life/bodily dysfunction?:  Drug abuse  Differential diagnoses/problems considered: see above     Amount and/or Complexity of Data Reviewed  Independent Historian: none (see above for summary)  External Data Reviewed: notes from previous ED visits and notes from clinic visits (see above for summary)  Risk and benefits of testing: discussed   Labs: Labs: ordered and reviewed  Radiology:Radiology:  ordered and independent interpretation performed (see above or ED course)  ECG/medicine tests:Radiology: ordered and independent interpretation performed (see above or ED course)  none    Risk  Parenteral controlled substances   Diagnosis or treatment significantly impacted by social determinants of health: psychiatric illness and substance abuse  Shared decision making     Critical Care  none            ED Course as of 05/03/23 0936   Wed May 03, 2023   0929 Patient said she is feeling better.  We talked about dehydration we talked about a work release for today and tomorrow.  She can go back on Friday if she is feeling okay and keeping liquids down if not she needs to follow up with her regular healthcare provider miss Farhana Osorio.  She understands this 1/3 L will be hung shortly her aunt sitting at the bedside voices her understanding also [DM]      ED Course User Index  [DM] Darryl Marroquin MD                 Clinical Impression:   Final diagnoses:  [R11.2, R19.7] Nausea vomiting and diarrhea (Primary)  [E86.0] Dehydration, moderate  [F19.10] Polysubstance abuse  [F31.9] Bipolar affective disorder, remission status unspecified        ED Disposition Condition    Discharge Stable          ED Prescriptions       Medication Sig Dispense Start Date End Date Auth. Provider    ondansetron (ZOFRAN-ODT) 4 MG TbDL Take 1 tablet (4 mg total) by mouth every 6 (six) hours as needed. 16 tablet 5/3/2023 -- Darryl Marroquin MD          Follow-up Information       Follow up With Specialties Details Why Contact Info    Farhana Osorio, GERTRUDE Family Medicine In 2 days As needed [address] [facility] [place] [address]  [phone]               Darryl Marroquin MD  05/03/23 0985

## 2023-05-03 NOTE — DISCHARGE INSTRUCTIONS
Please seek care for any drug issues.    Continue your current prescription medications    Return to work or school on Friday    Return for any emergency problem

## 2023-05-03 NOTE — Clinical Note
"Ashley Lamasfatmata Clayton was seen and treated in our emergency department on 5/3/2023.  She may return to work on 05/05/2023.       If you have any questions or concerns, please don't hesitate to call.      Darryl Marroquin MD"

## 2023-06-23 ENCOUNTER — LAB VISIT (OUTPATIENT)
Dept: LAB | Facility: HOSPITAL | Age: 33
End: 2023-06-23
Attending: CLINICAL NURSE SPECIALIST
Payer: MEDICAID

## 2023-06-23 DIAGNOSIS — Z79.899 ENCOUNTER FOR LONG-TERM (CURRENT) USE OF OTHER MEDICATIONS: Primary | ICD-10-CM

## 2023-06-23 LAB
ALBUMIN SERPL-MCNC: 4.1 G/DL (ref 3.4–5)
ALBUMIN/GLOB SERPL: 1.8 RATIO
ALP SERPL-CCNC: 93 UNIT/L (ref 50–144)
ALT SERPL-CCNC: 21 UNIT/L (ref 1–45)
ANION GAP SERPL CALC-SCNC: 4 MEQ/L (ref 2–13)
AST SERPL-CCNC: 21 UNIT/L (ref 14–36)
BASOPHILS # BLD AUTO: 0.05 X10(3)/MCL (ref 0.01–0.08)
BASOPHILS NFR BLD AUTO: 0.6 % (ref 0.1–1.2)
BILIRUBIN DIRECT+TOT PNL SERPL-MCNC: 0.3 MG/DL (ref 0–1)
BUN SERPL-MCNC: 14 MG/DL (ref 7–20)
CALCIUM SERPL-MCNC: 8.6 MG/DL (ref 8.4–10.2)
CHLORIDE SERPL-SCNC: 108 MMOL/L (ref 98–110)
CO2 SERPL-SCNC: 28 MMOL/L (ref 21–32)
CREAT SERPL-MCNC: 0.76 MG/DL (ref 0.66–1.25)
CREAT/UREA NIT SERPL: 18 (ref 12–20)
DEPRECATED CALCIDIOL+CALCIFEROL SERPL-MC: <20 NG/ML (ref 30–100)
EOSINOPHIL # BLD AUTO: 0.26 X10(3)/MCL (ref 0.04–0.36)
EOSINOPHIL NFR BLD AUTO: 3.2 % (ref 0.7–7)
ERYTHROCYTE [DISTWIDTH] IN BLOOD BY AUTOMATED COUNT: 12.5 % (ref 11–14.5)
EST. AVERAGE GLUCOSE BLD GHB EST-MCNC: 91.1 MG/DL (ref 70–115)
GFR SERPLBLD CREATININE-BSD FMLA CKD-EPI: >90 MLS/MIN/1.73/M2
GLOBULIN SER-MCNC: 2.3 GM/DL (ref 2–3.9)
GLUCOSE SERPL-MCNC: 81 MG/DL (ref 70–115)
HBA1C MFR BLD: 4.8 % (ref 4–6)
HCT VFR BLD AUTO: 46.4 % (ref 36–48)
HGB BLD-MCNC: 16.1 G/DL (ref 11.8–16)
IMM GRANULOCYTES # BLD AUTO: 0.02 X10(3)/MCL (ref 0–0.03)
IMM GRANULOCYTES NFR BLD AUTO: 0.2 % (ref 0–0.5)
LYMPHOCYTES # BLD AUTO: 1.77 X10(3)/MCL (ref 1.16–3.74)
LYMPHOCYTES NFR BLD AUTO: 22 % (ref 20–55)
MCH RBC QN AUTO: 33.8 PG (ref 27–34)
MCHC RBC AUTO-ENTMCNC: 34.7 G/DL (ref 31–37)
MCV RBC AUTO: 97.3 FL (ref 79–99)
MONOCYTES # BLD AUTO: 0.61 X10(3)/MCL (ref 0.24–0.36)
MONOCYTES NFR BLD AUTO: 7.6 % (ref 4.7–12.5)
NEUTROPHILS # BLD AUTO: 5.35 X10(3)/MCL (ref 1.56–6.13)
NEUTROPHILS NFR BLD AUTO: 66.4 % (ref 37–73)
NRBC BLD AUTO-RTO: 0 %
PLATELET # BLD AUTO: 215 X10(3)/MCL (ref 140–371)
PMV BLD AUTO: 10.2 FL (ref 9.4–12.4)
POTASSIUM SERPL-SCNC: 4.3 MMOL/L (ref 3.5–5.1)
PROLACTIN LEVEL (OHS): 10.55 NG/ML (ref 5.18–26.53)
PROT SERPL-MCNC: 6.4 GM/DL (ref 6.3–8.2)
RBC # BLD AUTO: 4.77 X10(6)/MCL (ref 4–5.1)
SODIUM SERPL-SCNC: 140 MMOL/L (ref 135–145)
T4 FREE SERPL-MCNC: 1.12 NG/DL (ref 0.78–2.19)
TSH SERPL-ACNC: 0.8 UIU/ML (ref 0.36–3.74)
WBC # SPEC AUTO: 8.06 X10(3)/MCL (ref 4–11.5)

## 2023-06-23 PROCEDURE — 82306 VITAMIN D 25 HYDROXY: CPT

## 2023-06-23 PROCEDURE — 83036 HEMOGLOBIN GLYCOSYLATED A1C: CPT

## 2023-06-23 PROCEDURE — 84146 ASSAY OF PROLACTIN: CPT

## 2023-06-23 PROCEDURE — 80053 COMPREHEN METABOLIC PANEL: CPT

## 2023-06-23 PROCEDURE — 84480 ASSAY TRIIODOTHYRONINE (T3): CPT

## 2023-06-23 PROCEDURE — 36415 COLL VENOUS BLD VENIPUNCTURE: CPT

## 2023-06-23 PROCEDURE — 84443 ASSAY THYROID STIM HORMONE: CPT

## 2023-06-23 PROCEDURE — 85025 COMPLETE CBC W/AUTO DIFF WBC: CPT

## 2023-06-23 PROCEDURE — 84439 ASSAY OF FREE THYROXINE: CPT

## 2023-06-24 LAB — T3 SERPL IA-MCNC: 118 NG/DL (ref 80–200)

## 2023-12-01 ENCOUNTER — HOSPITAL ENCOUNTER (EMERGENCY)
Facility: HOSPITAL | Age: 33
Discharge: HOME | End: 2023-12-01
Attending: INTERNAL MEDICINE
Payer: MEDICAID

## 2023-12-01 VITALS
OXYGEN SATURATION: 98 % | SYSTOLIC BLOOD PRESSURE: 121 MMHG | DIASTOLIC BLOOD PRESSURE: 83 MMHG | BODY MASS INDEX: 30.67 KG/M2 | WEIGHT: 179.63 LBS | HEIGHT: 64 IN | RESPIRATION RATE: 18 BRPM | HEART RATE: 103 BPM | TEMPERATURE: 98 F

## 2023-12-01 DIAGNOSIS — S39.012A STRAIN OF LUMBAR REGION, INITIAL ENCOUNTER: Primary | ICD-10-CM

## 2023-12-01 PROCEDURE — 99284 EMERGENCY DEPT VISIT MOD MDM: CPT

## 2023-12-01 PROCEDURE — 96372 THER/PROPH/DIAG INJ SC/IM: CPT | Performed by: NURSE PRACTITIONER

## 2023-12-01 PROCEDURE — 63600175 PHARM REV CODE 636 W HCPCS: Performed by: NURSE PRACTITIONER

## 2023-12-01 RX ORDER — DEXAMETHASONE SODIUM PHOSPHATE 4 MG/ML
4 INJECTION, SOLUTION INTRA-ARTICULAR; INTRALESIONAL; INTRAMUSCULAR; INTRAVENOUS; SOFT TISSUE
Status: COMPLETED | OUTPATIENT
Start: 2023-12-01 | End: 2023-12-01

## 2023-12-01 RX ORDER — NAPROXEN 500 MG/1
500 TABLET ORAL 2 TIMES DAILY
Qty: 28 TABLET | Refills: 0 | Status: SHIPPED | OUTPATIENT
Start: 2023-12-01 | End: 2023-12-15

## 2023-12-01 RX ORDER — CYCLOBENZAPRINE HCL 10 MG
10 TABLET ORAL 3 TIMES DAILY PRN
Qty: 15 TABLET | Refills: 0 | Status: SHIPPED | OUTPATIENT
Start: 2023-12-01 | End: 2023-12-06

## 2023-12-01 RX ADMIN — DEXAMETHASONE SODIUM PHOSPHATE 4 MG: 4 INJECTION, SOLUTION INTRA-ARTICULAR; INTRALESIONAL; INTRAMUSCULAR; INTRAVENOUS; SOFT TISSUE at 09:12

## 2023-12-02 NOTE — ED PROVIDER NOTES
Encounter Date: 12/1/2023       History     Chief Complaint   Patient presents with    Back Pain     Pt reports taking dog and cat food out of her car yesterday and pulling something in her lower back. No meds PTA .     Patient is a 33-year-old female who presents emerged department with complaints of low back pain.  She states pain started after she lifted up on some dog and cat food yesterday.  She denies any other injury or trauma.  She states it is mainly on the right and left side were denies pain on the bone for back.  She denies any loss of bladder or bowel.  She denies any fevers or chills.  She has no other complaints associated symptoms at this time.      Review of patient's allergies indicates:   Allergen Reactions    Latex, natural rubber Rash     Past Medical History:   Diagnosis Date    Diabetes mellitus      History reviewed. No pertinent surgical history.  History reviewed. No pertinent family history.     Review of Systems   Constitutional:  Negative for activity change, appetite change and fever.   HENT:  Negative for congestion, dental problem and sore throat.    Eyes:  Negative for discharge and itching.   Respiratory:  Negative for apnea, chest tightness and shortness of breath.    Cardiovascular:  Negative for chest pain.   Gastrointestinal:  Negative for nausea.   Endocrine: Negative for cold intolerance and heat intolerance.   Genitourinary:  Negative for dysuria, vaginal bleeding, vaginal discharge and vaginal pain.   Musculoskeletal:  Negative for back pain.   Skin:  Negative for rash.   Neurological:  Negative for dizziness, facial asymmetry and weakness.   Hematological:  Does not bruise/bleed easily.   Psychiatric/Behavioral:  Negative for agitation and behavioral problems.    All other systems reviewed and are negative.      Physical Exam     Initial Vitals [12/01/23 2132]   BP Pulse Resp Temp SpO2   121/83 103 18 97.6 °F (36.4 °C) 98 %      MAP       --         Physical Exam    Nursing  note and vitals reviewed.  Constitutional: Vital signs are normal. She appears well-developed and well-nourished.  Non-toxic appearance. She does not have a sickly appearance.   HENT:   Head: Normocephalic and atraumatic.   Right Ear: External ear normal.   Left Ear: External ear normal.   Eyes: Conjunctivae, EOM and lids are normal. Lids are everted and swept, no foreign bodies found.   Neck: Trachea normal and phonation normal. Neck supple. No thyroid mass and no thyromegaly present.   Normal range of motion.   Full passive range of motion without pain.     Cardiovascular:  Normal rate, regular rhythm, S1 normal, S2 normal, normal heart sounds, intact distal pulses and normal pulses.           Pulmonary/Chest: Breath sounds normal. No respiratory distress.   Abdominal: Abdomen is soft. She exhibits no distension.   Musculoskeletal:         General: Tenderness present.      Cervical back: Full passive range of motion without pain, normal range of motion and neck supple.      Comments: Left and right tenderness to the paraspinals of the lumbar spine.     Lymphadenopathy:     She has no cervical adenopathy.   Neurological: She is alert and oriented to person, place, and time. She has normal strength. GCS score is 15. GCS eye subscore is 4. GCS verbal subscore is 5. GCS motor subscore is 6.   Skin: Skin is warm, dry and intact. Capillary refill takes less than 2 seconds.   Psychiatric: She has a normal mood and affect. Her speech is normal and behavior is normal. Judgment normal. Cognition and memory are normal.         ED Course   Procedures  Labs Reviewed - No data to display       Imaging Results    None          Medications   dexAMETHasone injection 4 mg (4 mg Intramuscular Given 12/1/23 2140)     Medical Decision Making  Patient is a 33-year-old female who presents emerged department with complaints of low back pain.  She states pain started after she lifted up on some dog and cat food yesterday.  She denies any  other injury or trauma.  She states it is mainly on the right and left side were denies pain on the bone for back.  She denies any loss of bladder or bowel.  She denies any fevers or chills.  She has no other complaints associated symptoms at this time.        Problems Addressed:  Strain of lumbar region, initial encounter: acute illness or injury     Details: Patient has no midline spinous process tenderness so no x-ray was done.  She is tender to the muscles this area so I do think she mostly has a strain of the lumbar region.  Will send home muscle relaxers as she is driving of Decadron shot now for inflammation.    Risk  Prescription drug management.                                      Clinical Impression:  Final diagnoses:  [S39.012A] Strain of lumbar region, initial encounter (Primary)          ED Disposition Condition    Discharge Stable          ED Prescriptions       Medication Sig Dispense Start Date End Date Auth. Provider    naproxen (NAPROSYN) 500 MG tablet Take 1 tablet (500 mg total) by mouth 2 (two) times daily. for 14 days 28 tablet 12/1/2023 12/15/2023 Xavier Ko FNP    cyclobenzaprine (FLEXERIL) 10 MG tablet Take 1 tablet (10 mg total) by mouth 3 (three) times daily as needed for Muscle spasms. 15 tablet 12/1/2023 12/6/2023 Xavier Ko FNP          Follow-up Information       Follow up With Specialties Details Why Contact Info    Farhana Osorio FNP Family Medicine, Emergency Medicine Schedule an appointment as soon as possible for a visit  As needed, For wound re-check [address] [facility] [place] [address]  [phone]               Xavier Ko FNP  12/01/23 9415

## 2023-12-23 ENCOUNTER — HOSPITAL ENCOUNTER (EMERGENCY)
Facility: HOSPITAL | Age: 33
Discharge: HOME | End: 2023-12-23
Attending: FAMILY MEDICINE
Payer: MEDICAID

## 2023-12-23 VITALS
TEMPERATURE: 98 F | HEART RATE: 85 BPM | HEIGHT: 64 IN | SYSTOLIC BLOOD PRESSURE: 118 MMHG | OXYGEN SATURATION: 99 % | RESPIRATION RATE: 20 BRPM | WEIGHT: 180 LBS | BODY MASS INDEX: 30.73 KG/M2 | DIASTOLIC BLOOD PRESSURE: 76 MMHG

## 2023-12-23 DIAGNOSIS — N30.00 ACUTE CYSTITIS WITHOUT HEMATURIA: ICD-10-CM

## 2023-12-23 DIAGNOSIS — S39.012A STRAIN OF LUMBAR REGION, INITIAL ENCOUNTER: Primary | ICD-10-CM

## 2023-12-23 LAB
APPEARANCE UR: CLEAR
B-HCG SERPL QL: NEGATIVE
BACTERIA #/AREA URNS AUTO: ABNORMAL /HPF
BILIRUB UR QL STRIP.AUTO: NEGATIVE
COLOR UR AUTO: YELLOW
GLUCOSE UR QL STRIP.AUTO: NEGATIVE
KETONES UR QL STRIP.AUTO: NEGATIVE
LEUKOCYTE ESTERASE UR QL STRIP.AUTO: ABNORMAL
NITRITE UR QL STRIP.AUTO: NEGATIVE
PH UR STRIP.AUTO: 7.5 [PH]
PROT UR QL STRIP.AUTO: NEGATIVE
RBC #/AREA URNS AUTO: ABNORMAL /HPF
RBC UR QL AUTO: ABNORMAL
SP GR UR STRIP.AUTO: 1.01 (ref 1–1.03)
SQUAMOUS #/AREA URNS AUTO: ABNORMAL /HPF
UROBILINOGEN UR STRIP-ACNC: 0.2
WBC #/AREA URNS AUTO: ABNORMAL /HPF

## 2023-12-23 PROCEDURE — 81001 URINALYSIS AUTO W/SCOPE: CPT | Performed by: FAMILY MEDICINE

## 2023-12-23 PROCEDURE — 99284 EMERGENCY DEPT VISIT MOD MDM: CPT

## 2023-12-23 PROCEDURE — 81025 URINE PREGNANCY TEST: CPT | Performed by: FAMILY MEDICINE

## 2023-12-23 PROCEDURE — 87077 CULTURE AEROBIC IDENTIFY: CPT | Performed by: FAMILY MEDICINE

## 2023-12-23 PROCEDURE — 25000003 PHARM REV CODE 250: Performed by: FAMILY MEDICINE

## 2023-12-23 RX ORDER — NITROFURANTOIN 25; 75 MG/1; MG/1
100 CAPSULE ORAL 2 TIMES DAILY
Qty: 10 CAPSULE | Refills: 0 | Status: SHIPPED | OUTPATIENT
Start: 2023-12-23 | End: 2023-12-28

## 2023-12-23 RX ORDER — IBUPROFEN 400 MG/1
800 TABLET ORAL
Status: COMPLETED | OUTPATIENT
Start: 2023-12-23 | End: 2023-12-23

## 2023-12-23 RX ORDER — IBUPROFEN 800 MG/1
800 TABLET ORAL EVERY 8 HOURS PRN
Qty: 30 TABLET | Refills: 0 | Status: SHIPPED | OUTPATIENT
Start: 2023-12-23

## 2023-12-23 RX ORDER — CYCLOBENZAPRINE HCL 10 MG
10 TABLET ORAL 3 TIMES DAILY PRN
Qty: 21 TABLET | Refills: 0 | Status: SHIPPED | OUTPATIENT
Start: 2023-12-23 | End: 2023-12-30

## 2023-12-23 RX ORDER — CYCLOBENZAPRINE HCL 10 MG
10 TABLET ORAL
Status: COMPLETED | OUTPATIENT
Start: 2023-12-23 | End: 2023-12-23

## 2023-12-23 RX ADMIN — IBUPROFEN 800 MG: 400 TABLET, FILM COATED ORAL at 09:12

## 2023-12-23 RX ADMIN — CYCLOBENZAPRINE 10 MG: 10 TABLET, FILM COATED ORAL at 09:12

## 2023-12-23 NOTE — ED PROVIDER NOTES
Encounter Date: 12/23/2023       History     Chief Complaint   Patient presents with    Back Pain     C/O LOWER BACK PAIN 2ND TO MOVING COUCH X 2 NIGHTS AGO, PT REPORTS GOING TO ER IN [facility] 1 MONTH AGO FOR PULLING MUSCLE IN BACK     Patient complains of lower lumbar pain.  Onset yesterday.  Onset was after moving a couch with the day before.  She reports no numbness or weakness.  She reports no bowel or bladder difficulties.  She is taken nothing for symptoms.  No interventions have been attempted.  It is worse with movement.        Review of patient's allergies indicates:   Allergen Reactions    Latex, natural rubber Rash     Past Medical History:   Diagnosis Date    History of prediabetes      Past Surgical History:   Procedure Laterality Date    CHOLECYSTECTOMY      TONSILLECTOMY       History reviewed. No pertinent family history.  Social History     Tobacco Use    Smoking status: Every Day     Types: Cigarettes    Smokeless tobacco: Never   Substance Use Topics    Alcohol use: Not Currently    Drug use: Never     Review of Systems   Constitutional: Negative.    Respiratory: Negative.     Cardiovascular: Negative.    Musculoskeletal:  Positive for back pain.   Neurological:  Negative for weakness and numbness.       Physical Exam     Initial Vitals [12/23/23 0855]   BP Pulse Resp Temp SpO2   115/73 99 20 98.1 °F (36.7 °C) 100 %      MAP       --         Physical Exam    Constitutional: She appears well-developed and well-nourished.   HENT:   Head: Normocephalic.   Cardiovascular:  Normal rate, regular rhythm and normal heart sounds.           Pulmonary/Chest: Breath sounds normal.   Musculoskeletal:      Comments: Midline lower lumbar discomfort with range of motion.     Neurological: She displays normal reflexes. No sensory deficit.   Skin: Skin is warm and dry.         ED Course   Procedures  Labs Reviewed   URINALYSIS - Abnormal; Notable for the following components:       Result Value    Blood, UA  Trace-Intact (*)     Leukocyte Esterase, UA Moderate (*)     All other components within normal limits    Narrative:      URINE STABILITY IS 2 HOURS AT ROOM TEMP OR    SIX HOURS REFRIGERATED. PERFORMING TESTING ON    SPECIMENS GREATER THAN THIS AGE MAY AFFECT THE    FOLLOWING TESTS:    PH          SPECIFIC GRAVITY           BLOOD    CLARITY     BILIRUBIN               UROBILINOGEN   URINALYSIS, MICROSCOPIC - Abnormal; Notable for the following components:    WBC, UA 11-20 (*)     All other components within normal limits   HCG QUALITATIVE URINE - Normal   CULTURE, URINE          Imaging Results    None          Medications   ibuprofen tablet 800 mg (800 mg Oral Given 12/23/23 0941)   cyclobenzaprine tablet 10 mg (10 mg Oral Given 12/23/23 0942)     Medical Decision Making  Amount and/or Complexity of Data Reviewed  Labs: ordered.    Risk  Prescription drug management.      Additional MDM:   Differential Diagnosis:   Lumbar strain, herniated disc, UTI                                    Clinical Impression:  Final diagnoses:  [S39.012A] Strain of lumbar region, initial encounter (Primary)  [N30.00] Acute cystitis without hematuria          ED Disposition Condition    Discharge Stable          ED Prescriptions       Medication Sig Dispense Start Date End Date Auth. Provider    nitrofurantoin, macrocrystal-monohydrate, (MACROBID) 100 MG capsule Take 1 capsule (100 mg total) by mouth 2 (two) times daily. for 5 days 10 capsule 12/23/2023 12/28/2023 Armani Cabello MD    ibuprofen (ADVIL,MOTRIN) 800 MG tablet Take 1 tablet (800 mg total) by mouth every 8 (eight) hours as needed. 30 tablet 12/23/2023 -- Armani Cabello MD    cyclobenzaprine (FLEXERIL) 10 MG tablet Take 1 tablet (10 mg total) by mouth 3 (three) times daily as needed for Muscle spasms. 21 tablet 12/23/2023 12/30/2023 Armani Cabello MD          Follow-up Information       Follow up With Specialties Details Why Contact Info    Farhana Osorio,  FNP Family Medicine, Emergency Medicine In 3 days  [address] [address]  [phone]               Armani Cabello MD  12/23/23 1007

## 2023-12-26 LAB — BACTERIA UR CULT: NORMAL

## 2024-01-19 ENCOUNTER — HOSPITAL ENCOUNTER (EMERGENCY)
Facility: HOSPITAL | Age: 34
Discharge: HOME | End: 2024-01-19
Attending: INTERNAL MEDICINE
Payer: MEDICAID

## 2024-01-19 VITALS
DIASTOLIC BLOOD PRESSURE: 87 MMHG | WEIGHT: 179.19 LBS | TEMPERATURE: 98 F | HEART RATE: 98 BPM | BODY MASS INDEX: 30.76 KG/M2 | SYSTOLIC BLOOD PRESSURE: 112 MMHG | RESPIRATION RATE: 18 BRPM | OXYGEN SATURATION: 96 %

## 2024-01-19 DIAGNOSIS — K59.00 CONSTIPATION, UNSPECIFIED CONSTIPATION TYPE: Primary | ICD-10-CM

## 2024-01-19 DIAGNOSIS — R10.9 ABDOMINAL PAIN: ICD-10-CM

## 2024-01-19 LAB
ALBUMIN SERPL-MCNC: 3.8 G/DL (ref 3.5–5)
ALBUMIN/GLOB SERPL: 1.4 RATIO (ref 1.1–2)
ALP SERPL-CCNC: 69 UNIT/L (ref 40–150)
ALT SERPL-CCNC: 17 UNIT/L (ref 0–55)
AMPHET UR QL SCN: POSITIVE
APPEARANCE UR: ABNORMAL
AST SERPL-CCNC: 15 UNIT/L (ref 5–34)
B-HCG SERPL QL: NEGATIVE
BACTERIA #/AREA URNS AUTO: ABNORMAL /HPF
BARBITURATE SCN PRESENT UR: NEGATIVE
BASOPHILS # BLD AUTO: 0.01 X10(3)/MCL
BASOPHILS NFR BLD AUTO: 0.1 %
BENZODIAZ UR QL SCN: NEGATIVE
BILIRUB SERPL-MCNC: 0.3 MG/DL
BILIRUB UR QL STRIP.AUTO: NEGATIVE
BUN SERPL-MCNC: 16 MG/DL (ref 7–18.7)
CALCIUM SERPL-MCNC: 9.9 MG/DL (ref 8.4–10.2)
CANNABINOIDS UR QL SCN: POSITIVE
CHLORIDE SERPL-SCNC: 102 MMOL/L (ref 98–107)
CO2 SERPL-SCNC: 28 MMOL/L (ref 22–29)
COCAINE UR QL SCN: POSITIVE
COLOR UR AUTO: YELLOW
CREAT SERPL-MCNC: 0.88 MG/DL (ref 0.55–1.02)
EOSINOPHIL # BLD AUTO: 0.23 X10(3)/MCL (ref 0–0.9)
EOSINOPHIL NFR BLD AUTO: 1.7 %
ERYTHROCYTE [DISTWIDTH] IN BLOOD BY AUTOMATED COUNT: 12.4 % (ref 11.5–17)
FENTANYL UR QL SCN: POSITIVE
GFR SERPLBLD CREATININE-BSD FMLA CKD-EPI: >60 MLS/MIN/1.73/M2
GLOBULIN SER-MCNC: 2.7 GM/DL (ref 2.4–3.5)
GLUCOSE SERPL-MCNC: 122 MG/DL (ref 74–100)
GLUCOSE UR QL STRIP.AUTO: NEGATIVE
HCT VFR BLD AUTO: 44.7 % (ref 37–47)
HGB BLD-MCNC: 15.2 G/DL (ref 12–16)
IMM GRANULOCYTES # BLD AUTO: 0.03 X10(3)/MCL (ref 0–0.04)
IMM GRANULOCYTES NFR BLD AUTO: 0.2 %
KETONES UR QL STRIP.AUTO: NEGATIVE
LEUKOCYTE ESTERASE UR QL STRIP.AUTO: ABNORMAL
LIPASE SERPL-CCNC: 12 U/L
LYMPHOCYTES # BLD AUTO: 3.56 X10(3)/MCL (ref 0.6–4.6)
LYMPHOCYTES NFR BLD AUTO: 26.2 %
MCH RBC QN AUTO: 33.6 PG (ref 27–31)
MCHC RBC AUTO-ENTMCNC: 34 G/DL (ref 33–36)
MCV RBC AUTO: 98.7 FL (ref 80–94)
MDMA UR QL SCN: NEGATIVE
MONOCYTES # BLD AUTO: 0.97 X10(3)/MCL (ref 0.1–1.3)
MONOCYTES NFR BLD AUTO: 7.1 %
NEUTROPHILS # BLD AUTO: 8.77 X10(3)/MCL (ref 2.1–9.2)
NEUTROPHILS NFR BLD AUTO: 64.7 %
NITRITE UR QL STRIP.AUTO: NEGATIVE
OPIATES UR QL SCN: NEGATIVE
PCP UR QL: NEGATIVE
PH UR STRIP.AUTO: 6 [PH]
PH UR: 6 [PH] (ref 3–11)
PLATELET # BLD AUTO: 268 X10(3)/MCL (ref 130–400)
PMV BLD AUTO: 9.9 FL (ref 7.4–10.4)
POTASSIUM SERPL-SCNC: 3.9 MMOL/L (ref 3.5–5.1)
PROT SERPL-MCNC: 6.5 GM/DL (ref 6.4–8.3)
PROT UR QL STRIP.AUTO: NEGATIVE
RBC # BLD AUTO: 4.53 X10(6)/MCL (ref 4.2–5.4)
RBC #/AREA URNS AUTO: ABNORMAL /HPF
RBC UR QL AUTO: ABNORMAL
SODIUM SERPL-SCNC: 140 MMOL/L (ref 136–145)
SP GR UR STRIP.AUTO: 1.02 (ref 1–1.03)
SPECIFIC GRAVITY, URINE AUTO (.000) (OHS): 1.02 (ref 1–1.03)
SQUAMOUS #/AREA URNS AUTO: ABNORMAL /HPF
UROBILINOGEN UR STRIP-ACNC: 0.2
WBC # SPEC AUTO: 13.57 X10(3)/MCL (ref 4.5–11.5)
WBC #/AREA URNS AUTO: ABNORMAL /HPF

## 2024-01-19 PROCEDURE — 99284 EMERGENCY DEPT VISIT MOD MDM: CPT

## 2024-01-19 PROCEDURE — 81025 URINE PREGNANCY TEST: CPT | Performed by: NURSE PRACTITIONER

## 2024-01-19 PROCEDURE — 83690 ASSAY OF LIPASE: CPT | Performed by: NURSE PRACTITIONER

## 2024-01-19 PROCEDURE — 81003 URINALYSIS AUTO W/O SCOPE: CPT | Performed by: NURSE PRACTITIONER

## 2024-01-19 PROCEDURE — 80053 COMPREHEN METABOLIC PANEL: CPT | Performed by: NURSE PRACTITIONER

## 2024-01-19 PROCEDURE — 80307 DRUG TEST PRSMV CHEM ANLYZR: CPT | Performed by: NURSE PRACTITIONER

## 2024-01-19 PROCEDURE — 87086 URINE CULTURE/COLONY COUNT: CPT | Performed by: NURSE PRACTITIONER

## 2024-01-19 PROCEDURE — 25000003 PHARM REV CODE 250: Performed by: NURSE PRACTITIONER

## 2024-01-19 PROCEDURE — 85025 COMPLETE CBC W/AUTO DIFF WBC: CPT | Performed by: NURSE PRACTITIONER

## 2024-01-19 RX ORDER — LACTULOSE 10 G/15ML
30 SOLUTION ORAL 2 TIMES DAILY
Qty: 2700 ML | Refills: 0 | Status: SHIPPED | OUTPATIENT
Start: 2024-01-19 | End: 2024-02-18

## 2024-01-19 RX ORDER — BISACODYL 10 MG/1
10 SUPPOSITORY RECTAL DAILY PRN
Qty: 14 SUPPOSITORY | Refills: 0 | Status: SHIPPED | OUTPATIENT
Start: 2024-01-19 | End: 2024-02-02

## 2024-01-19 RX ORDER — LACTULOSE 10 G/15ML
30 SOLUTION ORAL
Status: COMPLETED | OUTPATIENT
Start: 2024-01-19 | End: 2024-01-19

## 2024-01-19 RX ADMIN — LACTULOSE 30 G: 20 SOLUTION ORAL at 10:01

## 2024-01-20 NOTE — DISCHARGE INSTRUCTIONS
Drink lots of water. Take lactulose as directed to help with constipation. Take dulcolax suppository daily to help with constipation. May try enema at home as well. If you start to have vomiting, fever, or hard abdominal pain , return to ER

## 2024-01-20 NOTE — ED PROVIDER NOTES
Encounter Date: 1/19/2024       History     Chief Complaint   Patient presents with    Abdominal Pain     LLQ abdominal pain intermittently, reports pressure in rectum. Denies n/v/d     See MDM    The history is provided by the patient. No  was used.     Review of patient's allergies indicates:   Allergen Reactions    Latex, natural rubber Rash     Past Medical History:   Diagnosis Date    History of prediabetes      Past Surgical History:   Procedure Laterality Date    CHOLECYSTECTOMY      TONSILLECTOMY       History reviewed. No pertinent family history.  Social History     Tobacco Use    Smoking status: Every Day     Types: Cigarettes    Smokeless tobacco: Never   Substance Use Topics    Alcohol use: Not Currently    Drug use: Yes     Types: Methamphetamines, Cocaine, Marijuana     Review of Systems   Constitutional:  Negative for fever.   Gastrointestinal:  Positive for abdominal pain and constipation. Negative for nausea and vomiting.   All other systems reviewed and are negative.      Physical Exam     Initial Vitals [01/19/24 1956]   BP Pulse Resp Temp SpO2   131/89 101 18 97.8 °F (36.6 °C) 98 %      MAP       --         Physical Exam    Nursing note and vitals reviewed.  Constitutional: She appears well-developed and well-nourished.   Cardiovascular:  Normal rate, regular rhythm and normal heart sounds.           Pulmonary/Chest: Breath sounds normal. No respiratory distress.   Abdominal: Abdomen is soft. Bowel sounds are normal. She exhibits no distension. There is no abdominal tenderness.   Musculoskeletal:         General: Normal range of motion.     Neurological: She is alert and oriented to person, place, and time.   Skin: Skin is warm and dry.   Psychiatric: She has a normal mood and affect.         ED Course   Procedures  Labs Reviewed   CBC WITH DIFFERENTIAL - Abnormal; Notable for the following components:       Result Value    WBC 13.57 (*)     MCV 98.7 (*)     MCH 33.6 (*)      All other components within normal limits   COMPREHENSIVE METABOLIC PANEL - Abnormal; Notable for the following components:    Glucose Level 122 (*)     All other components within normal limits   URINALYSIS, REFLEX TO URINE CULTURE - Abnormal; Notable for the following components:    Appearance, UA Slightly Cloudy (*)     Blood, UA Trace-Intact (*)     Leukocyte Esterase, UA 1+ (*)     All other components within normal limits   DRUG SCREEN, URINE (BEAKER) - Abnormal; Notable for the following components:    Amphetamines, Urine Positive (*)     Cannabinoids, Urine Positive (*)     Cocaine, Urine Positive (*)     Fentanyl, Urine Positive (*)     All other components within normal limits    Narrative:     Cut off concentrations:    Amphetamines - 1000 ng/ml  Barbiturates - 200 ng/ml  Benzodiazepine - 200 ng/ml  Cannabinoids (THC) - 50 ng/ml  Cocaine - 300 ng/ml  Fentanyl - 1.0 ng/ml  MDMA - 500 ng/ml  Opiates - 300 ng/ml   Phencyclidine (PCP) - 25 ng/ml    Specimen submitted for drug analysis and tested for pH and specific gravity in order to evaluate sample integrity. Suspect tampering if specific gravity is <1.003 and/or pH is not within the range of 4.5 - 8.0  False negatives may result form substances such as bleach added to urine.  False positives may result for the presence of a substance with similar chemical structure to the drug or its metabolite.    This test provides only a PRELIMINARY analytical test result. A more specific alternate chemical method must be used in order to obtain a confirmed analytical result. Gas chromatography/mass spectrometry (GC/MS) is the preferred confirmatory method. Other chemical confirmation methods are available. Clinical consideration and professional judgement should be applied to any drug of abuse test result, particularly when preliminary positive results are used.    Positive results will be confirmed only at the physicians request. Unconfirmed screening results are to  be used only for medical purposes (treatment).        URINALYSIS, MICROSCOPIC - Abnormal; Notable for the following components:    Bacteria, UA Few (*)     RBC, UA 11-20 (*)     WBC, UA 21-50 (*)     Squamous Epithelial Cells, UA Moderate (*)     All other components within normal limits   LIPASE - Normal   PREGNANCY TEST, URINE RAPID - Normal   CULTURE, URINE          Imaging Results              X-Ray Abdomen Flat And Erect (In process)  Result time 01/19/24 20:46:41                     Medications   lactulose 20 gram/30 mL solution Soln 30 g (has no administration in time range)     Medical Decision Making  34 y/o female who presents with intermittent abdominal pain and constipation since she was 17 years old. She is supposed to take medications for constipation but doesn't. States no n/v. No fever. She states she is also working on quitting using drugs.     Labs show wbc 13 without shift, creatinine normal, electrolytes normal, lipase normal, UA has moderate squamous so likely contaminated. UDS + for fentanyl, cocaine, amphetamine, cannabis. Xray shows moderate stool throughout.     Plan is to treat with oral laxatives and suppository along with encouraging her to try enema at home. She isn't vomiting. Abdomen is soft. Not toxic. Also encouraged to stop using narcotics as this may be contributing to her constipation.     Amount and/or Complexity of Data Reviewed  Labs: ordered. Decision-making details documented in ED Course.  Radiology: ordered and independent interpretation performed.     Details: Moderate stool throughout    Risk  Prescription drug management.      Additional MDM:   Differential Diagnosis:   Other: The following diagnoses were also considered and will be evaluated: UTI, constipation and dehydration.                                   Clinical Impression:  Final diagnoses:  [R10.9] Abdominal pain  [K59.00] Constipation, unspecified constipation type (Primary)          ED Disposition Condition     Discharge Stable          ED Prescriptions       Medication Sig Dispense Start Date End Date Auth. Provider    lactulose (CHRONULAC) 20 gram/30 mL Soln Take 45 mLs (30 g total) by mouth 2 (two) times daily. 2,700 mL 1/19/2024 2/18/2024 Brandee Copeland FNP    bisacodyL (DULCOLAX, BISACODYL,) 10 mg Supp Place 1 suppository (10 mg total) rectally daily as needed (constipation). 14 suppository 1/19/2024 2/2/2024 Brandee Copeland FNP          Follow-up Information       Follow up With Specialties Details Why Contact Info    Farhana Osorio FNP Family Medicine, Emergency Medicine   [facility] [place] [id number] [phone]               Brandee Copeland FNP  01/19/24 3403

## 2024-01-22 LAB — BACTERIA UR CULT: NORMAL

## 2024-05-31 ENCOUNTER — HOSPITAL ENCOUNTER (EMERGENCY)
Facility: HOSPITAL | Age: 34
Discharge: HOME | End: 2024-05-31
Attending: INTERNAL MEDICINE
Payer: MEDICAID

## 2024-05-31 VITALS
HEIGHT: 64 IN | HEART RATE: 92 BPM | SYSTOLIC BLOOD PRESSURE: 132 MMHG | BODY MASS INDEX: 30.86 KG/M2 | RESPIRATION RATE: 16 BRPM | TEMPERATURE: 98 F | OXYGEN SATURATION: 98 % | WEIGHT: 180.75 LBS | DIASTOLIC BLOOD PRESSURE: 85 MMHG

## 2024-05-31 DIAGNOSIS — S39.012A LUMBAR STRAIN, INITIAL ENCOUNTER: Primary | ICD-10-CM

## 2024-05-31 LAB
AMPHET UR QL SCN: POSITIVE
B-HCG UR QL: NEGATIVE
BACTERIA #/AREA URNS AUTO: ABNORMAL /HPF
BARBITURATE SCN PRESENT UR: NEGATIVE
BENZODIAZ UR QL SCN: NEGATIVE
BILIRUB UR QL STRIP.AUTO: NEGATIVE
CANNABINOIDS UR QL SCN: POSITIVE
CLARITY UR: CLEAR
COCAINE UR QL SCN: NEGATIVE
COLOR UR AUTO: YELLOW
FENTANYL UR QL SCN: POSITIVE
GLUCOSE UR QL STRIP: NEGATIVE
HGB UR QL STRIP: ABNORMAL
KETONES UR QL STRIP: NEGATIVE
LEUKOCYTE ESTERASE UR QL STRIP: NEGATIVE
MDMA UR QL SCN: NEGATIVE
NITRITE UR QL STRIP: NEGATIVE
OPIATES UR QL SCN: NEGATIVE
PCP UR QL: NEGATIVE
PH UR STRIP: 5.5 [PH]
PH UR: 5.5 [PH] (ref 3–11)
PROT UR QL STRIP: NEGATIVE
RBC #/AREA URNS AUTO: ABNORMAL /HPF
SP GR UR STRIP.AUTO: 1.01 (ref 1–1.03)
SPECIFIC GRAVITY, URINE AUTO (.000) (OHS): 1.01 (ref 1–1.03)
SQUAMOUS #/AREA URNS AUTO: ABNORMAL /HPF
UROBILINOGEN UR STRIP-ACNC: 0.2
WBC #/AREA URNS AUTO: ABNORMAL /HPF

## 2024-05-31 PROCEDURE — 96372 THER/PROPH/DIAG INJ SC/IM: CPT | Performed by: INTERNAL MEDICINE

## 2024-05-31 PROCEDURE — 81003 URINALYSIS AUTO W/O SCOPE: CPT | Performed by: STUDENT IN AN ORGANIZED HEALTH CARE EDUCATION/TRAINING PROGRAM

## 2024-05-31 PROCEDURE — 25000003 PHARM REV CODE 250: Performed by: INTERNAL MEDICINE

## 2024-05-31 PROCEDURE — 80307 DRUG TEST PRSMV CHEM ANLYZR: CPT | Performed by: INTERNAL MEDICINE

## 2024-05-31 PROCEDURE — 81025 URINE PREGNANCY TEST: CPT | Performed by: STUDENT IN AN ORGANIZED HEALTH CARE EDUCATION/TRAINING PROGRAM

## 2024-05-31 PROCEDURE — 63600175 PHARM REV CODE 636 W HCPCS: Performed by: INTERNAL MEDICINE

## 2024-05-31 PROCEDURE — 99285 EMERGENCY DEPT VISIT HI MDM: CPT | Mod: 25

## 2024-05-31 RX ORDER — TIZANIDINE 4 MG/1
4 TABLET ORAL EVERY 8 HOURS PRN
Qty: 15 TABLET | Refills: 0 | Status: SHIPPED | OUTPATIENT
Start: 2024-05-31

## 2024-05-31 RX ORDER — PREDNISONE 20 MG/1
20 TABLET ORAL DAILY
Qty: 5 TABLET | Refills: 0 | Status: SHIPPED | OUTPATIENT
Start: 2024-05-31 | End: 2024-06-05

## 2024-05-31 RX ORDER — CYCLOBENZAPRINE HCL 10 MG
10 TABLET ORAL
Status: COMPLETED | OUTPATIENT
Start: 2024-05-31 | End: 2024-05-31

## 2024-05-31 RX ORDER — KETOROLAC TROMETHAMINE 30 MG/ML
60 INJECTION, SOLUTION INTRAMUSCULAR; INTRAVENOUS
Status: COMPLETED | OUTPATIENT
Start: 2024-05-31 | End: 2024-05-31

## 2024-05-31 RX ADMIN — CYCLOBENZAPRINE 10 MG: 10 TABLET, FILM COATED ORAL at 06:05

## 2024-05-31 RX ADMIN — KETOROLAC TROMETHAMINE 60 MG: 30 INJECTION, SOLUTION INTRAMUSCULAR; INTRAVENOUS at 06:05

## 2024-05-31 NOTE — ED PROVIDER NOTES
Source of History:  Patient, no limitations    Chief complaint:  Back Pain (Pt c/o lower back pain starting yesterday.)      HPI:  Ashley Clayton is a 34 y.o. female presenting with Back Pain (Pt c/o lower back pain starting yesterday.)       Hx of IVDU, woke up with severe low back pain, no red flags  Patient presents with complaint of back pain. This is a result of no known injury. Onset of pain was several months ago and worse yesterday. The pain is located in diffuse lower back, described as knife-like and stiffness and rated as severe, with radiation. Symptoms include tingling, leg pain. The patient also complains of chills. The patient denies perianal numbness. The patient denies other injuries. Care prior to arrival consisted of self medicating with street drugs      Review of Systems   Constitutional symptoms:  Negative except as documented in HPI.   Skin symptoms:  Negative except as documented in HPI.   HEENT symptoms:  Negative except as documented in HPI.   Respiratory symptoms:  Negative except as documented in HPI.   Cardiovascular symptoms:  Negative except as documented in HPI.   Gastrointestinal symptoms:  Negative except as documented in HPI.    Genitourinary symptoms:  Negative except as documented in HPI.   Musculoskeletal symptoms:  Negative except as documented in HPI.   Neurologic symptoms:  Negative except as documented in HPI.   Psychiatric symptoms:  Negative except as documented in HPI.   Allergy/immunologic symptoms:  Negative except as documented in HPI.             Additional review of systems information: All other systems reviewed and otherwise negative.      Review of patient's allergies indicates:   Allergen Reactions    Latex, natural rubber Rash       PMH:  As per HPI and below:    Past Medical History:   Diagnosis Date    History of prediabetes        History reviewed. No pertinent family history.    Past Surgical History:   Procedure Laterality Date    CHOLECYSTECTOMY       "TONSILLECTOMY         Social History     Tobacco Use    Smoking status: Every Day     Types: Cigarettes    Smokeless tobacco: Never   Substance Use Topics    Alcohol use: Not Currently    Drug use: Yes     Types: Methamphetamines, Cocaine, Marijuana       Patient Active Problem List   Diagnosis    First degree burn of right forearm        Physical Exam:    /81 (BP Location: Right arm, Patient Position: Sitting)   Pulse 90   Temp 97.7 °F (36.5 °C) (Tympanic)   Resp 20   Ht 5' 4" (1.626 m)   Wt 82 kg (180 lb 12.4 oz)   LMP 04/29/2024 (Approximate)   SpO2 97%   BMI 31.03 kg/m²     Nursing note and vital signs reviewed.    General:  Alert, diaphoretic, wearing sweatshirt which is odd for 90 degree weather  Skin: Normal for Ethnic Origin, No cyanosis  HEENT: Normocephalic and atraumatic, Vision unchanged  Cardiovascular:  Regular rate and rhythm, No edema  Chest Wall: No deformity, equal chest rise  Respiratory:  Lungs are clear to auscultation, respirations are non-labored.    Musculoskeletal:  No deformity, Normal perfusion to all extremities  Gastrointestinal:  Soft, Non distended  Neurological:  Alert and oriented, normal motor observed, normal speech observed.  Antalgic and slow narrow based gait  Psychiatric:  Cooperative, anxious mood & affect.        Labs that have been ordered have been independently reviewed and interpreted by myself.     Old Chart Reviewed.      Initial Impression/ Differential Dx:  Muscular pain, herniated disc, spine fracture, intra-abdominal causes and urinary tract infection, dehydration.       MDM:      Reviewed Nurses Note.    Reviewed Pertinent old records.    Orders Placed This Encounter    CT Lumbar Spine Without Contrast    Pregnancy, urine rapid    Urinalysis, Reflex to Urine Culture    Urinalysis, Microscopic    Drug Screen, Urine    ketorolac injection 60 mg    cyclobenzaprine tablet 10 mg    predniSONE (DELTASONE) 20 MG tablet    tiZANidine (ZANAFLEX) 4 MG tablet "                    Labs Reviewed   URINALYSIS, REFLEX TO URINE CULTURE - Abnormal; Notable for the following components:       Result Value    Blood, UA Trace-Intact (*)     All other components within normal limits   URINALYSIS, MICROSCOPIC - Abnormal; Notable for the following components:    Squamous Epithelial Cells, UA Few (*)     All other components within normal limits   DRUG SCREEN, URINE (BEAKER) - Abnormal; Notable for the following components:    Amphetamines, Urine Positive (*)     Cannabinoids, Urine Positive (*)     Fentanyl, Urine Positive (*)     All other components within normal limits    Narrative:     Cut off concentrations:    Amphetamines - 1000 ng/ml  Barbiturates - 200 ng/ml  Benzodiazepine - 200 ng/ml  Cannabinoids (THC) - 50 ng/ml  Cocaine - 300 ng/ml  Fentanyl - 1.0 ng/ml  MDMA - 500 ng/ml  Opiates - 300 ng/ml   Phencyclidine (PCP) - 25 ng/ml    Specimen submitted for drug analysis and tested for pH and specific gravity in order to evaluate sample integrity. Suspect tampering if specific gravity is <1.003 and/or pH is not within the range of 4.5 - 8.0  False negatives may result form substances such as bleach added to urine.  False positives may result for the presence of a substance with similar chemical structure to the drug or its metabolite.    This test provides only a PRELIMINARY analytical test result. A more specific alternate chemical method must be used in order to obtain a confirmed analytical result. Gas chromatography/mass spectrometry (GC/MS) is the preferred confirmatory method. Other chemical confirmation methods are available. Clinical consideration and professional judgement should be applied to any drug of abuse test result, particularly when preliminary positive results are used.    Positive results will be confirmed only at the physicians request. Unconfirmed screening results are to be used only for medical purposes (treatment).        PREGNANCY TEST, URINE RAPID -  Normal          CT Lumbar Spine Without Contrast              Admission on 05/31/2024   Component Date Value Ref Range Status    hCG Qualitative, Urine 05/31/2024 Negative  Negative Final    Color, UA 05/31/2024 Yellow  Yellow, Light-Yellow, Dark Yellow, Prabha, Straw Final    Appearance, UA 05/31/2024 Clear  Clear Final    Specific Gravity, UA 05/31/2024 1.015  1.005 - 1.030 Final    pH, UA 05/31/2024 5.5  5.0 - 8.5 Final    Protein, UA 05/31/2024 Negative  Negative Final    Glucose, UA 05/31/2024 Negative  Negative, Normal Final    Ketones, UA 05/31/2024 Negative  Negative Final    Blood, UA 05/31/2024 Trace-Intact (A)  Negative Final    Bilirubin, UA 05/31/2024 Negative  Negative Final    Urobilinogen, UA 05/31/2024 0.2  0.2, 1.0, Normal Final    Nitrites, UA 05/31/2024 Negative  Negative Final    Leukocyte Esterase, UA 05/31/2024 Negative  Negative Final    Bacteria, UA 05/31/2024 None Seen  None Seen, Rare, Occasional /HPF Final    RBC, UA 05/31/2024 None Seen  None Seen, 0-2, 3-5, 0-5 /HPF Final    WBC, UA 05/31/2024 0-2  None Seen, 0-2, 3-5, 0-5 /HPF Final    Squamous Epithelial Cells, UA 05/31/2024 Few (A)  None Seen, Rare, Occasional, Occ /HPF Final    Amphetamines, Urine 05/31/2024 Positive (A)  Negative Final    Barbiturates, Urine 05/31/2024 Negative  Negative Final    Benzodiazepine, Urine 05/31/2024 Negative  Negative Final    Cannabinoids, Urine 05/31/2024 Positive (A)  Negative Final    Cocaine, Urine 05/31/2024 Negative  Negative Final    Fentanyl, Urine 05/31/2024 Positive (A)  Negative Final    MDMA, Urine 05/31/2024 Negative  Negative Final    Opiates, Urine 05/31/2024 Negative  Negative Final    Phencyclidine, Urine 05/31/2024 Negative  Negative Final    pH, Urine 05/31/2024 5.5  3.0 - 11.0 Final    Specific Gravity, Urine Auto 05/31/2024 1.015  1.001 - 1.035 Final       Imaging Results              CT Lumbar Spine Without Contrast (Preliminary result)  Result time 05/31/24 06:42:35       Preliminary result by Marc Guerrier Jr., MD (05/31/24 06:42:35)                   Narrative:    START OF REPORT:  Technique: CT of the lumbar spine was performed without intravenous contrast with direct axial as well as sagittal and coronal reconstruction images.    Comparison: None.    Clinical history: Back pain.    Findings:  Anatomy: Unremarkable.  Mineralization: The bony mineralization is within normal limits.  Bone alignment: Unremarkable with no listhesis.  Curvature: The lumbar lordosis is maintained.  Bone and bone marrow: The vertebral body heights are maintained. Limbus verterbrae are seen in the anterosuperior corners of L2 and L4.  Intervertebral disc spaces: The intervertebral discs are preserved throughout.  Endplate Sclerosis: No significant endplate sclerosis is seen.  Facet degenerative changes: No significant facet degenerative changes are seen.  Spinal canal: No bony spinal canal stenosis identified.  Fractures: No acute fracture dislocation or subluxation is seen in the lumbar spine.  Orthopedic Hardware: None.  Vertebral Fusion: None.      Impression:  1. No acute fracture dislocation or subluxation is seen in the lumbar spine.  2. Details and findings as above.                                                       ED Course as of 05/31/24 0650   Fri May 31, 2024   0603 Leukocyte Esterase, UA: Negative [MP]   0603 NITRITE UA: Negative [MP]   0603 Specific Gravity,UA: 1.015 [MP]   0604 hCG Qualitative, Urine: Negative [MP]   0631 Amphetamines, Urine(!): Positive [MP]   0631 Cannabinoids, Urine(!): Positive [MP]   0631 Fentanyl, Urine(!): Positive [MP]      ED Course User Index  [MP] Teofilo Slaughter,                         Diagnostic Impression:    1. Lumbar strain, initial encounter         ED Disposition Condition    Discharge Stable             Follow-up Information       [facility] - Emergency Dept.    Specialty: Emergency Medicine  Why: If symptoms  worsen  Contact information:  [address]  [facility] [address]  [phone]                            ED Prescriptions       Medication Sig Dispense Start Date End Date Auth. Provider    predniSONE (DELTASONE) 20 MG tablet Take 1 tablet (20 mg total) by mouth once daily. for 5 days 5 tablet 5/31/2024 6/5/2024 Teofilo Slaughter DO    tiZANidine (ZANAFLEX) 4 MG tablet Take 1 tablet (4 mg total) by mouth every 8 (eight) hours as needed (spasms). 15 tablet 5/31/2024 -- Teofilo Slaughter DO          Follow-up Information       Follow up With Specialties Details Why Contact Info    [facility] - Emergency Dept Emergency Medicine  If symptoms worsen [address]  [facility] [id number] [phone]             Teofilo Slaughter DO  05/31/24 0650

## 2024-09-10 ENCOUNTER — HOSPITAL ENCOUNTER (EMERGENCY)
Facility: HOSPITAL | Age: 34
Discharge: HOME | End: 2024-09-10
Attending: FAMILY MEDICINE
Payer: MEDICAID

## 2024-09-10 VITALS
SYSTOLIC BLOOD PRESSURE: 122 MMHG | TEMPERATURE: 98 F | BODY MASS INDEX: 31.41 KG/M2 | DIASTOLIC BLOOD PRESSURE: 89 MMHG | RESPIRATION RATE: 18 BRPM | OXYGEN SATURATION: 99 % | WEIGHT: 184 LBS | HEART RATE: 83 BPM | HEIGHT: 64 IN

## 2024-09-10 DIAGNOSIS — S61.411A LACERATION OF RIGHT HAND WITHOUT FOREIGN BODY, INITIAL ENCOUNTER: Primary | ICD-10-CM

## 2024-09-10 PROCEDURE — 12001 RPR S/N/AX/GEN/TRNK 2.5CM/<: CPT

## 2024-09-10 PROCEDURE — 99282 EMERGENCY DEPT VISIT SF MDM: CPT | Mod: 25

## 2024-09-10 NOTE — DISCHARGE INSTRUCTIONS
Keep clean and dry. Ok to get wet tomorrow. Return with fever, chills, body aches, purulence from wound. Avoid using hand to allow for proper healing. Return with new or worsening symptoms.

## 2024-09-10 NOTE — ED PROVIDER NOTES
"Encounter Date: 9/10/2024       History     Chief Complaint   Patient presents with    Laceration     Pt arrived per pov with c/o laceration to R. Hand, approximately 1hr PTA.  Pt reports she was cutting potatoes and "the knife slipped."  Laceration noted in between R. Thumb and index finger, bleeding controlled.       See MDM.     The history is provided by the patient and the spouse. No  was used.     Review of patient's allergies indicates:   Allergen Reactions    Latex, natural rubber Rash     Past Medical History:   Diagnosis Date    Anxiety disorder, unspecified     Bipolar disorder, unspecified     Depression     History of prediabetes     Schizophrenia, unspecified      Past Surgical History:   Procedure Laterality Date    CHOLECYSTECTOMY      TONSILLECTOMY       No family history on file.  Social History     Tobacco Use    Smoking status: Every Day     Current packs/day: 1.00     Types: Cigarettes    Smokeless tobacco: Never   Substance Use Topics    Alcohol use: Yes     Comment: occasionally    Drug use: Yes     Types: Methamphetamines, Cocaine, Marijuana     Review of Systems   Constitutional:  Negative for chills and fever.   Skin:  Positive for wound.   All other systems reviewed and are negative.      Physical Exam     Initial Vitals [09/10/24 1250]   BP Pulse Resp Temp SpO2   122/89 83 18 98 °F (36.7 °C) 99 %      MAP       --         Physical Exam    Nursing note and vitals reviewed.  Constitutional: She appears well-nourished. She is not diaphoretic. No distress.   HENT:   Head: Normocephalic and atraumatic.   Cardiovascular:  Normal rate and intact distal pulses.           Pulmonary/Chest: No respiratory distress.   Musculoskeletal:         General: Normal range of motion.     Neurological: She is alert and oriented to person, place, and time. No sensory deficit.   Skin: Capillary refill takes less than 2 seconds.   Psychiatric: She has a normal mood and affect. Her behavior is " normal.         ED Course   Lac Repair    Date/Time: 9/10/2024 12:47 PM    Performed by: Smita Alanis PA  Authorized by: Armani Cabello MD    Consent:     Consent obtained:  Verbal    Consent given by:  Patient    Risks, benefits, and alternatives were discussed: yes      Risks discussed:  Poor cosmetic result and poor wound healing    Alternatives discussed:  No treatment  Anesthesia:     Anesthesia method:  None  Laceration details:     Location:  Hand    Hand location:  R hand, dorsum    Length (cm):  2  Treatment:     Area cleansed with:  Chlorhexidine    Amount of cleaning:  Standard    Irrigation solution:  Sterile saline    Irrigation method:  Syringe  Skin repair:     Repair method:  Steri-Strips and tissue adhesive    Number of Steri-Strips:  1  Approximation:     Approximation:  Close  Repair type:     Repair type:  Simple  Post-procedure details:     Dressing:  Open (no dressing)    Procedure completion:  Tolerated    Labs Reviewed - No data to display       Imaging Results    None          Medications - No data to display  Medical Decision Making  Pt is a 33 y/o female who presents with right hand laceration since just PTA. States that she was chopping potatoes and the knife slipped and she cut her hand. Did not clean it out prior to arrival. States pain is a 5/10, has not taken anything for the pain. Was able to control the bleeding at home.     Pt is not toxic appearing, no acute distress. Pt was adamant that she did not want stitches. Repaired with dermabond and steri strip. Educated on keeping clean and dry. Return with fever, chills, body aches, purulent drainage, further concerns for infection. Pt expressed understanding and was in agreement with the plan.       Additional MDM:   Differential Diagnosis:   Other: The following diagnoses were also considered and will be evaluated: abrasion, contusion and rash.                                   Clinical Impression:  Final  diagnoses:  [S61.411A] Laceration of right hand without foreign body, initial encounter (Primary)          ED Disposition Condition    Discharge Stable          ED Prescriptions    None       Follow-up Information       Follow up With Specialties Details Why Contact Info    Farhana Osorio FNP Family Medicine, Emergency Medicine Call in 1 week  [address] [facility] [place] [address]  [phone]               Smita Alanis PA  09/10/24 6388

## 2024-09-11 ENCOUNTER — HOSPITAL ENCOUNTER (EMERGENCY)
Facility: HOSPITAL | Age: 34
Discharge: HOME | End: 2024-09-11
Attending: SURGERY
Payer: MEDICAID

## 2024-09-11 VITALS
SYSTOLIC BLOOD PRESSURE: 120 MMHG | BODY MASS INDEX: 31.41 KG/M2 | DIASTOLIC BLOOD PRESSURE: 76 MMHG | HEIGHT: 64 IN | TEMPERATURE: 98 F | WEIGHT: 184 LBS | OXYGEN SATURATION: 100 % | HEART RATE: 88 BPM | RESPIRATION RATE: 16 BRPM

## 2024-09-11 DIAGNOSIS — S61.411D LACERATION OF RIGHT HAND WITHOUT FOREIGN BODY, SUBSEQUENT ENCOUNTER: Primary | ICD-10-CM

## 2024-09-11 PROCEDURE — 25000003 PHARM REV CODE 250: Performed by: SURGERY

## 2024-09-11 PROCEDURE — 99284 EMERGENCY DEPT VISIT MOD MDM: CPT | Mod: 25

## 2024-09-11 PROCEDURE — 63600175 PHARM REV CODE 636 W HCPCS: Performed by: SURGERY

## 2024-09-11 PROCEDURE — 96372 THER/PROPH/DIAG INJ SC/IM: CPT | Performed by: SURGERY

## 2024-09-11 RX ORDER — AMOXICILLIN AND CLAVULANATE POTASSIUM 875; 125 MG/1; MG/1
1 TABLET, FILM COATED ORAL EVERY 12 HOURS
Qty: 14 TABLET | Refills: 0 | Status: SHIPPED | OUTPATIENT
Start: 2024-09-11 | End: 2024-09-18

## 2024-09-11 RX ADMIN — LIDOCAINE HYDROCHLORIDE 250 MG: 10 INJECTION, SOLUTION INFILTRATION; PERINEURAL at 09:09

## 2024-09-11 RX ADMIN — BACITRACIN ZINC, NEOMYCIN, POLYMYXIN B SULFAT 1 EACH: 5000; 3.5; 4 OINTMENT TOPICAL at 09:09

## 2024-09-11 NOTE — Clinical Note
"Ashley Lamasfatmata Clayton was seen and treated in our emergency department on 9/11/2024.  She may return to work on 09/13/2024.       If you have any questions or concerns, please don't hesitate to call.      Mark Quispe MD"

## 2024-09-11 NOTE — ED PROVIDER NOTES
Encounter Date: 9/11/2024       History     Chief Complaint   Patient presents with    Laceration     PT PRESENTS TO ED PER POV WITH C/O LACERATION TO RIGHT HAND BETWEEN 1ST AND 2ND DIGITS ONSET YESTERDAY. STATES SHE WAS SEEN IN ED YESTERDAY AND HAD GLUE AND STERI STRIP APPLIED. STERI STRIP FELL OFF IN SLEEP SO SHE IS BACK. NO ACTIVE BLEEDING, NO S/S OF INFECTION.     35 YO WF W/ LACERATION RIGHT FIRST INTERDIGITAL WEBSPACE.  +EVALUATED YESTERDAY W/ STERI STRIP APPLIED.  +OCCURRED WHILE CLEANING POTATOES IN KITCHEN.  NO WORK W/ CHICKEN OR OTHER MEATS.  NO SUPPURATION.  NO ABx.        Review of patient's allergies indicates:   Allergen Reactions    Latex, natural rubber Rash     Past Medical History:   Diagnosis Date    Anxiety disorder, unspecified     Bipolar disorder, unspecified     Depression     History of prediabetes     Schizophrenia, unspecified      Past Surgical History:   Procedure Laterality Date    CHOLECYSTECTOMY      TONSILLECTOMY       No family history on file.  Social History     Tobacco Use    Smoking status: Every Day     Current packs/day: 0.50     Types: Cigarettes    Smokeless tobacco: Never   Substance Use Topics    Alcohol use: Yes     Comment: occasionally    Drug use: Yes     Types: Methamphetamines, Cocaine, Marijuana     Review of Systems   All other systems reviewed and are negative.      Physical Exam     Initial Vitals [09/11/24 0926]   BP Pulse Resp Temp SpO2   115/89 96 17 98.3 °F (36.8 °C) 99 %      MAP       --         Physical Exam    Nursing note and vitals reviewed.  Constitutional: She appears well-developed and well-nourished.   HENT:   Head: Normocephalic and atraumatic.   Right Ear: External ear normal.   Left Ear: External ear normal.   Nose: Nose normal.   Mouth/Throat: Oropharynx is clear and moist.   Eyes: Conjunctivae and EOM are normal. Pupils are equal, round, and reactive to light. No scleral icterus.   Neck: Neck supple. Thyromegaly present. No tracheal deviation  present. No JVD present.   Normal range of motion.  Cardiovascular:  Normal rate, regular rhythm, normal heart sounds and intact distal pulses.     Exam reveals no gallop.       No murmur heard.  Pulmonary/Chest: Breath sounds normal. No stridor. No respiratory distress. She has no wheezes. She has no rhonchi. She has no rales.   Abdominal: Abdomen is soft. Bowel sounds are normal. She exhibits no distension. There is no abdominal tenderness. There is no rebound and no guarding.   Musculoskeletal:         General: Normal range of motion.      Cervical back: Normal range of motion and neck supple.     Neurological: She is alert and oriented to person, place, and time. She has normal strength. No cranial nerve deficit or sensory deficit.   Skin:   +LINEAR 1.25 CM LACERATION RIGHT DORSAL FIRST INTERDIGITAL WEBSPACE.  NO FLUCTUANCE.  NO DISCHARGE.  +BLANCHING ERYTHEMA SURROUNDING.  NVI DISTALLY.     Psychiatric: She has a normal mood and affect. Her behavior is normal. Judgment and thought content normal.         ED Course   Procedures  Labs Reviewed - No data to display       Imaging Results    None          Medications   neomycin-bacitracnZn-polymyxnB packet (has no administration in time range)   cefTRIAXone (Rocephin) 250 mg in LIDOcaine HCL 10 mg/ml (1%) 1 mL IM only syringe (has no administration in time range)   neomycin-bacitracin-polymyxin ointment (has no administration in time range)     Medical Decision Making                                    Clinical Impression:  Final diagnoses:  [X84.894Z] Laceration of right hand without foreign body, subsequent encounter (Primary)          ED Disposition Condition    Discharge Stable          ED Prescriptions       Medication Sig Dispense Start Date End Date Auth. Provider    amoxicillin-clavulanate 875-125mg (AUGMENTIN) 875-125 mg per tablet Take 1 tablet by mouth every 12 (twelve) hours. for 7 days 14 tablet 9/11/2024 9/18/2024 Mark Quispe MD           Follow-up Information    None          Mark Quispe MD  09/11/24 4819

## 2024-09-11 NOTE — DISCHARGE INSTRUCTIONS
NEOSPORIN DRESSING CHANGES DAILY.  FOLLOW UP WITH PERSONAL PHYSICIAN OR IN ED [facility] IN 48 HRS.  RETURN TO ER IF SYMPTOMS INCREASE OR IF NEW SYMPTOMS DEVELOP.

## 2024-10-02 ENCOUNTER — HOSPITAL ENCOUNTER (EMERGENCY)
Facility: HOSPITAL | Age: 34
Discharge: HOME | End: 2024-10-02
Attending: INTERNAL MEDICINE
Payer: COMMERCIAL

## 2024-10-02 VITALS
RESPIRATION RATE: 19 BRPM | HEIGHT: 64 IN | DIASTOLIC BLOOD PRESSURE: 88 MMHG | SYSTOLIC BLOOD PRESSURE: 123 MMHG | TEMPERATURE: 98 F | WEIGHT: 185 LBS | BODY MASS INDEX: 31.58 KG/M2 | HEART RATE: 92 BPM | OXYGEN SATURATION: 99 %

## 2024-10-02 DIAGNOSIS — T14.90XA TRAUMA: ICD-10-CM

## 2024-10-02 DIAGNOSIS — S80.11XA CONTUSION OF RIGHT LEG, INITIAL ENCOUNTER: ICD-10-CM
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
## 2024-10-02 DIAGNOSIS — S40.011A CONTUSION OF RIGHT SHOULDER, INITIAL ENCOUNTER: ICD-10-CM

## 2024-10-02 DIAGNOSIS — S70.12XA CONTUSION OF LEFT THIGH, INITIAL ENCOUNTER: ICD-10-CM

## 2024-10-02 DIAGNOSIS — S60.511A ABRASION OF RIGHT HAND AND FINGERS, INITIAL ENCOUNTER: ICD-10-CM

## 2024-10-02 DIAGNOSIS — S60.419A ABRASION OF RIGHT HAND AND FINGERS, INITIAL ENCOUNTER: ICD-10-CM

## 2024-10-02 PROCEDURE — 99284 EMERGENCY DEPT VISIT MOD MDM: CPT | Mod: 25

## 2024-10-02 RX ORDER — IBUPROFEN 600 MG/1
600 TABLET ORAL EVERY 6 HOURS PRN
Qty: 40 TABLET | Refills: 0 | Status: ON HOLD | OUTPATIENT
Start: 2024-10-02 | End: 2024-10-09 | Stop reason: HOSPADM

## 2024-10-02 RX ORDER — TIZANIDINE 4 MG/1
4 TABLET ORAL EVERY 8 HOURS PRN
Qty: 15 TABLET | Refills: 0 | Status: ON HOLD | OUTPATIENT
Start: 2024-10-02 | End: 2024-10-09 | Stop reason: HOSPADM

## 2024-10-02 NOTE — DISCHARGE INSTRUCTIONS
Take medicines as prescribed    See your family doctor in one to 2 days for further evaluation, workup, and treatment as necessary as some injuries may become more apparent in next 24-48 hours.    Avoid driving or operating machinery while taking medicines as some medicines might cause drowsiness and may cause problems.        Take medicines as prescribed    See your family doctor in one to 2 days for further evaluation, workup, and treatment as necessary    Avoid driving or operating machinery while taking medicines as some medicines might cause drowsiness and may cause problems. Also pain medicines have potential of being addictive  so use Pain meds specially Narcotics Sparingly.    The exam and treatment you received in Emergency Room was for an urgent problem and NOT INTENDED AS COMPLETE CARE. It is important that you FOLLOW UP with a doctor for ongoing care. If your symptoms become WORSE or you DO NOT IMPROVE and you are unable to reach your health care provider, you should RETURN to the emergency department. The Emergency Room doctor has provided a PRELIMINARY INTERPRETATION of all your STUDIES. A final interpretation may be done after you are discharged. IF A CHANGE in your diagnosis or treatment is needed WE WILL CONTACT YOU. It is critical that we have a CURRENT PHONE NUMBER FOR YOU.
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
## 2024-10-02 NOTE — Clinical Note
"Ashley Donahue" Forrest was seen and treated in our emergency department on 10/2/2024.  She may return to work on 10/05/2024.       If you have any questions or concerns, please don't hesitate to call.       RN    "

## 2024-10-04 ENCOUNTER — HOSPITAL ENCOUNTER (INPATIENT)
Facility: HOSPITAL | Age: 34
LOS: 6 days | Discharge: HOME | DRG: 885 | End: 2024-10-10
Attending: PSYCHIATRY & NEUROLOGY | Admitting: PSYCHIATRY & NEUROLOGY
Payer: MEDICAID

## 2024-10-04 ENCOUNTER — HOSPITAL ENCOUNTER (EMERGENCY)
Facility: HOSPITAL | Age: 34
Discharge: PSYCHIATRIC OR SUBSTANCE USE FACILITY | End: 2024-10-04
Attending: EMERGENCY MEDICINE
Payer: MEDICAID

## 2024-10-04 VITALS
DIASTOLIC BLOOD PRESSURE: 91 MMHG | BODY MASS INDEX: 31.41 KG/M2 | WEIGHT: 184 LBS | RESPIRATION RATE: 20 BRPM | HEART RATE: 100 BPM | TEMPERATURE: 98 F | HEIGHT: 64 IN | SYSTOLIC BLOOD PRESSURE: 116 MMHG | OXYGEN SATURATION: 97 %

## 2024-10-04 DIAGNOSIS — R45.851 DEPRESSION WITH SUICIDAL IDEATION: Primary | ICD-10-CM

## 2024-10-04 DIAGNOSIS — F15.10 METHAMPHETAMINE ABUSE: ICD-10-CM

## 2024-10-04 DIAGNOSIS — F32.A DEPRESSION WITH SUICIDAL IDEATION: Primary | ICD-10-CM

## 2024-10-04 DIAGNOSIS — F32.A DEPRESSION WITH SUICIDAL IDEATION: ICD-10-CM

## 2024-10-04 DIAGNOSIS — R45.851 DEPRESSION WITH SUICIDAL IDEATION: ICD-10-CM

## 2024-10-04 LAB
ALBUMIN SERPL-MCNC: 4.5 G/DL (ref 3.4–5)
ALBUMIN/GLOB SERPL: 1.6 RATIO
ALP SERPL-CCNC: 57 UNIT/L (ref 50–144)
ALT SERPL-CCNC: 27 UNIT/L (ref 1–45)
AMPHET UR QL SCN: ABNORMAL
ANION GAP SERPL CALC-SCNC: 7 MEQ/L (ref 2–13)
APAP SERPL-MCNC: <10 UG/ML (ref 10–30)
AST SERPL-CCNC: 39 UNIT/L (ref 14–36)
B-HCG UR QL: NEGATIVE
BARBITURATE SCN PRESENT UR: NEGATIVE
BASOPHILS # BLD AUTO: 0.02 X10(3)/MCL (ref 0.01–0.08)
BASOPHILS NFR BLD AUTO: 0.2 % (ref 0.1–1.2)
BENZODIAZ UR QL SCN: NEGATIVE
BILIRUB SERPL-MCNC: 0.7 MG/DL (ref 0–1)
BILIRUB UR QL STRIP.AUTO: NEGATIVE
BUN SERPL-MCNC: 15 MG/DL (ref 7–20)
CALCIUM SERPL-MCNC: 9 MG/DL (ref 8.4–10.2)
CANNABINOIDS UR QL SCN: POSITIVE
CHLORIDE SERPL-SCNC: 108 MMOL/L (ref 98–110)
CLARITY UR: CLEAR
CO2 SERPL-SCNC: 21 MMOL/L (ref 21–32)
COCAINE UR QL SCN: NEGATIVE
COLOR UR AUTO: YELLOW
CREAT SERPL-MCNC: 0.65 MG/DL (ref 0.66–1.25)
CREAT/UREA NIT SERPL: 23 (ref 12–20)
EOSINOPHIL # BLD AUTO: 0.3 X10(3)/MCL (ref 0.04–0.36)
EOSINOPHIL NFR BLD AUTO: 3 % (ref 0.7–7)
ERYTHROCYTE [DISTWIDTH] IN BLOOD BY AUTOMATED COUNT: 12.5 % (ref 11–14.5)
ETHANOL BLD-MCNC: <0.01 G/DL
ETHANOL SERPL-MCNC: <10 MG/DL
GFR SERPLBLD CREATININE-BSD FMLA CKD-EPI: >90 ML/MIN/1.73/M2
GLOBULIN SER-MCNC: 2.9 GM/DL (ref 2–3.9)
GLUCOSE SERPL-MCNC: 93 MG/DL (ref 70–115)
GLUCOSE UR QL STRIP: NEGATIVE
HCT VFR BLD AUTO: 48.8 % (ref 36–48)
HGB BLD-MCNC: 16.7 G/DL (ref 11.8–16)
HGB UR QL STRIP: NEGATIVE
IMM GRANULOCYTES # BLD AUTO: 0.03 X10(3)/MCL (ref 0–0.03)
IMM GRANULOCYTES NFR BLD AUTO: 0.3 % (ref 0–0.5)
KETONES UR QL STRIP: NEGATIVE
LEUKOCYTE ESTERASE UR QL STRIP: NEGATIVE
LYMPHOCYTES # BLD AUTO: 1.46 X10(3)/MCL (ref 1.16–3.74)
LYMPHOCYTES NFR BLD AUTO: 14.7 % (ref 20–55)
MCH RBC QN AUTO: 33.3 PG (ref 27–34)
MCHC RBC AUTO-ENTMCNC: 34.2 G/DL (ref 31–37)
MCV RBC AUTO: 97.4 FL (ref 79–99)
METHADONE UR QL SCN: NEGATIVE
MONOCYTES # BLD AUTO: 0.79 X10(3)/MCL (ref 0.24–0.36)
MONOCYTES NFR BLD AUTO: 8 % (ref 4.7–12.5)
NEUTROPHILS # BLD AUTO: 7.3 X10(3)/MCL (ref 1.56–6.13)
NEUTROPHILS NFR BLD AUTO: 73.8 % (ref 37–73)
NITRITE UR QL STRIP: NEGATIVE
OPIATES UR QL SCN: NEGATIVE
PCP UR QL: NEGATIVE
PH UR STRIP: 6.5 [PH]
PH UR: 6.5 [PH] (ref 5–8)
PLATELET # BLD AUTO: 208 X10(3)/MCL (ref 140–371)
PMV BLD AUTO: 10 FL (ref 9.4–12.4)
POTASSIUM SERPL-SCNC: 5 MMOL/L (ref 3.5–5.1)
PROT SERPL-MCNC: 7.4 GM/DL (ref 6.3–8.2)
PROT UR QL STRIP: NEGATIVE
RBC # BLD AUTO: 5.01 X10(6)/MCL (ref 4–5.1)
SALICYLATES SERPL-MCNC: <1 MG/DL
SARS-COV-2 RDRP RESP QL NAA+PROBE: NEGATIVE
SODIUM SERPL-SCNC: 136 MMOL/L (ref 136–145)
SP GR UR STRIP.AUTO: 1.01 (ref 1–1.03)
TSH SERPL-ACNC: 1.57 UIU/ML (ref 0.36–3.74)
UROBILINOGEN UR STRIP-ACNC: 0.2
WBC # BLD AUTO: 9.9 X10(3)/MCL (ref 4–11.5)

## 2024-10-04 PROCEDURE — U0002 COVID-19 LAB TEST NON-CDC: HCPCS | Performed by: EMERGENCY MEDICINE

## 2024-10-04 PROCEDURE — 85025 COMPLETE CBC W/AUTO DIFF WBC: CPT | Performed by: EMERGENCY MEDICINE

## 2024-10-04 PROCEDURE — S4991 NICOTINE PATCH NONLEGEND: HCPCS | Performed by: PSYCHIATRY & NEUROLOGY

## 2024-10-04 PROCEDURE — 80143 DRUG ASSAY ACETAMINOPHEN: CPT | Performed by: EMERGENCY MEDICINE

## 2024-10-04 PROCEDURE — 81025 URINE PREGNANCY TEST: CPT | Performed by: EMERGENCY MEDICINE

## 2024-10-04 PROCEDURE — 11400000 HC PSYCH PRIVATE ROOM

## 2024-10-04 PROCEDURE — 80307 DRUG TEST PRSMV CHEM ANLYZR: CPT | Performed by: EMERGENCY MEDICINE

## 2024-10-04 PROCEDURE — 80053 COMPREHEN METABOLIC PANEL: CPT | Performed by: EMERGENCY MEDICINE

## 2024-10-04 PROCEDURE — 82077 ASSAY SPEC XCP UR&BREATH IA: CPT | Performed by: EMERGENCY MEDICINE

## 2024-10-04 PROCEDURE — 36415 COLL VENOUS BLD VENIPUNCTURE: CPT | Performed by: EMERGENCY MEDICINE

## 2024-10-04 PROCEDURE — 25000003 PHARM REV CODE 250: Performed by: PSYCHIATRY & NEUROLOGY

## 2024-10-04 PROCEDURE — 84443 ASSAY THYROID STIM HORMONE: CPT | Performed by: EMERGENCY MEDICINE

## 2024-10-04 PROCEDURE — 80179 DRUG ASSAY SALICYLATE: CPT | Performed by: EMERGENCY MEDICINE

## 2024-10-04 PROCEDURE — 99285 EMERGENCY DEPT VISIT HI MDM: CPT

## 2024-10-04 PROCEDURE — 81003 URINALYSIS AUTO W/O SCOPE: CPT | Mod: 59 | Performed by: EMERGENCY MEDICINE

## 2024-10-04 RX ORDER — ALUMINUM HYDROXIDE, MAGNESIUM HYDROXIDE, AND SIMETHICONE 1200; 120; 1200 MG/30ML; MG/30ML; MG/30ML
30 SUSPENSION ORAL EVERY 6 HOURS PRN
Status: DISCONTINUED | OUTPATIENT
Start: 2024-10-04 | End: 2024-10-10 | Stop reason: HOSPADM

## 2024-10-04 RX ORDER — BUPROPION HYDROCHLORIDE 300 MG/1
300 TABLET ORAL DAILY
Status: DISCONTINUED | OUTPATIENT
Start: 2024-10-05 | End: 2024-10-10 | Stop reason: HOSPADM

## 2024-10-04 RX ORDER — ACETAMINOPHEN 325 MG/1
650 TABLET ORAL EVERY 6 HOURS PRN
Status: DISCONTINUED | OUTPATIENT
Start: 2024-10-04 | End: 2024-10-10 | Stop reason: HOSPADM

## 2024-10-04 RX ORDER — BUSPIRONE HYDROCHLORIDE 10 MG/1
10 TABLET ORAL 2 TIMES DAILY
Status: ON HOLD | COMMUNITY
Start: 2024-08-08 | End: 2024-10-09 | Stop reason: HOSPADM

## 2024-10-04 RX ORDER — LEVOCETIRIZINE DIHYDROCHLORIDE 5 MG/1
5 TABLET, FILM COATED ORAL DAILY
Status: ON HOLD | COMMUNITY
Start: 2024-09-05 | End: 2024-10-09 | Stop reason: HOSPADM

## 2024-10-04 RX ORDER — IBUPROFEN 200 MG
24 TABLET ORAL
Status: DISCONTINUED | OUTPATIENT
Start: 2024-10-04 | End: 2024-10-07

## 2024-10-04 RX ORDER — OLANZAPINE 10 MG/1
10 TABLET, ORALLY DISINTEGRATING ORAL EVERY 8 HOURS PRN
Status: DISCONTINUED | OUTPATIENT
Start: 2024-10-04 | End: 2024-10-10 | Stop reason: HOSPADM

## 2024-10-04 RX ORDER — BUPROPION HYDROCHLORIDE 300 MG/1
300 TABLET ORAL DAILY
Status: ON HOLD | COMMUNITY
End: 2024-10-09

## 2024-10-04 RX ORDER — THYROID 30 MG/1
30 TABLET ORAL
Status: DISCONTINUED | OUTPATIENT
Start: 2024-10-05 | End: 2024-10-10 | Stop reason: HOSPADM

## 2024-10-04 RX ORDER — PANTOPRAZOLE SODIUM 40 MG/1
40 TABLET, DELAYED RELEASE ORAL DAILY
COMMUNITY
Start: 2024-09-05

## 2024-10-04 RX ORDER — METFORMIN HYDROCHLORIDE 500 MG/1
500 TABLET, EXTENDED RELEASE ORAL DAILY
Status: DISCONTINUED | OUTPATIENT
Start: 2024-10-05 | End: 2024-10-05

## 2024-10-04 RX ORDER — IBUPROFEN 400 MG/1
400 TABLET ORAL EVERY 6 HOURS PRN
Status: DISCONTINUED | OUTPATIENT
Start: 2024-10-04 | End: 2024-10-10 | Stop reason: HOSPADM

## 2024-10-04 RX ORDER — LEVOTHYROXINE, LIOTHYRONINE 19; 4.5 UG/1; UG/1
30 TABLET ORAL
COMMUNITY

## 2024-10-04 RX ORDER — IBUPROFEN 200 MG
1 TABLET ORAL DAILY
Status: DISCONTINUED | OUTPATIENT
Start: 2024-10-04 | End: 2024-10-07

## 2024-10-04 RX ORDER — CETIRIZINE HYDROCHLORIDE 5 MG/1
5 TABLET ORAL NIGHTLY
Status: DISCONTINUED | OUTPATIENT
Start: 2024-10-04 | End: 2024-10-04

## 2024-10-04 RX ORDER — HYDROXYZINE PAMOATE 25 MG/1
50 CAPSULE ORAL EVERY 6 HOURS PRN
Status: DISCONTINUED | OUTPATIENT
Start: 2024-10-04 | End: 2024-10-10 | Stop reason: HOSPADM

## 2024-10-04 RX ORDER — IBUPROFEN 200 MG
1 TABLET ORAL DAILY
Status: DISCONTINUED | OUTPATIENT
Start: 2024-10-04 | End: 2024-10-04

## 2024-10-04 RX ORDER — PANTOPRAZOLE SODIUM 40 MG/1
40 TABLET, DELAYED RELEASE ORAL DAILY
Status: DISCONTINUED | OUTPATIENT
Start: 2024-10-05 | End: 2024-10-10 | Stop reason: HOSPADM

## 2024-10-04 RX ORDER — CETIRIZINE HYDROCHLORIDE 10 MG/1
10 TABLET ORAL DAILY
Status: DISCONTINUED | OUTPATIENT
Start: 2024-10-04 | End: 2024-10-10 | Stop reason: HOSPADM

## 2024-10-04 RX ORDER — MUPIROCIN 20 MG/G
OINTMENT TOPICAL 2 TIMES DAILY
Status: DISCONTINUED | OUTPATIENT
Start: 2024-10-04 | End: 2024-10-04

## 2024-10-04 RX ORDER — IBUPROFEN 200 MG
16 TABLET ORAL
Status: DISCONTINUED | OUTPATIENT
Start: 2024-10-04 | End: 2024-10-07

## 2024-10-04 RX ORDER — IBUPROFEN 400 MG/1
400 TABLET ORAL EVERY 6 HOURS PRN
Status: DISCONTINUED | OUTPATIENT
Start: 2024-10-04 | End: 2024-10-04

## 2024-10-04 RX ORDER — ARIPIPRAZOLE 400 MG
400 KIT INTRAMUSCULAR
COMMUNITY
Start: 2024-09-18

## 2024-10-04 RX ORDER — LORAZEPAM 1 MG/1
1 TABLET ORAL 3 TIMES DAILY
Status: DISCONTINUED | OUTPATIENT
Start: 2024-10-04 | End: 2024-10-06

## 2024-10-04 RX ORDER — ATOMOXETINE 40 MG/1
40 CAPSULE ORAL EVERY MORNING
Status: ON HOLD | COMMUNITY
End: 2024-10-09 | Stop reason: HOSPADM

## 2024-10-04 RX ORDER — METFORMIN HYDROCHLORIDE 500 MG/1
500 TABLET, EXTENDED RELEASE ORAL DAILY
Status: ON HOLD | COMMUNITY
End: 2024-10-09 | Stop reason: HOSPADM

## 2024-10-04 RX ORDER — GLUCAGON 1 MG
1 KIT INJECTION
Status: DISCONTINUED | OUTPATIENT
Start: 2024-10-04 | End: 2024-10-07

## 2024-10-04 RX ORDER — BUSPIRONE HYDROCHLORIDE 10 MG/1
10 TABLET ORAL 2 TIMES DAILY
Status: DISCONTINUED | OUTPATIENT
Start: 2024-10-04 | End: 2024-10-08

## 2024-10-04 RX ADMIN — NICOTINE 1 PATCH: 21 PATCH, EXTENDED RELEASE TRANSDERMAL at 02:10

## 2024-10-04 RX ADMIN — LORAZEPAM 1 MG: 1 TABLET ORAL at 08:10

## 2024-10-04 RX ADMIN — CETIRIZINE HYDROCHLORIDE 10 MG: 10 TABLET, FILM COATED ORAL at 08:10

## 2024-10-04 RX ADMIN — BUSPIRONE HYDROCHLORIDE 10 MG: 10 TABLET ORAL at 09:10
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
## 2024-10-04 NOTE — ED PROVIDER NOTES
Encounter Date: 10/4/2024       History     Chief Complaint   Patient presents with    Mental Health Problem     Pt presented to ED per EMS with c/o mental evaluation. EMS reported pt's neighbor called them reported pt had suicidal ideations. On arrival pt denies suicidal or homicidal ideations at this time. Pt reported she smoked meth this morning and blacked out afterwards and doesn't remember what she said to anyone.      Patient is 35-year-old female who presents after reportedly making suicidal comments.  Patient does endorse depression, but denies suicidal ideation.  She states that she does not recall making those comments.  She did use meth this morning.  She uses meth and marijuana regularly.  She does have a history of depression and bipolar and takes medications.      Review of patient's allergies indicates:   Allergen Reactions    Latex, natural rubber Rash     Past Medical History:   Diagnosis Date    Anxiety disorder, unspecified     Bipolar disorder, unspecified     Depression     History of prediabetes     Schizophrenia, unspecified      Past Surgical History:   Procedure Laterality Date    CHOLECYSTECTOMY      ECTOPIC PREGNANCY SURGERY Left     Salpingectomy    TONSILLECTOMY       Family History   Problem Relation Name Age of Onset    Atrial fibrillation Father       Social History     Tobacco Use    Smoking status: Every Day     Current packs/day: 0.50     Types: Cigarettes    Smokeless tobacco: Never   Substance Use Topics    Alcohol use: Yes     Comment: occasionally    Drug use: Yes     Types: Methamphetamines, Cocaine, Marijuana     Review of Systems   Psychiatric/Behavioral:  Positive for dysphoric mood.    All other systems reviewed and are negative.      Physical Exam     Initial Vitals [10/04/24 0912]   BP Pulse Resp Temp SpO2   (!) 118/91 (!) 59 18 97.8 °F (36.6 °C) 100 %      MAP       --         Physical Exam    Nursing note and vitals reviewed.  Constitutional: She appears  well-developed and well-nourished. No distress.   HENT:   Head: Normocephalic and atraumatic.   Eyes: Conjunctivae and EOM are normal. Pupils are equal, round, and reactive to light.   Neck: Neck supple. No tracheal deviation present.   Cardiovascular:  Normal rate, regular rhythm and normal heart sounds.           Pulmonary/Chest: Breath sounds normal. No respiratory distress.   Musculoskeletal:         General: No tenderness or edema. Normal range of motion.      Cervical back: Neck supple.     Neurological: She is alert and oriented to person, place, and time. GCS score is 15. GCS eye subscore is 4. GCS verbal subscore is 5. GCS motor subscore is 6.   No focal deficits   Skin: Skin is warm. No rash noted. No erythema.   Psychiatric:   Depressed and tearful         ED Course   Procedures  Labs Reviewed   COMPREHENSIVE METABOLIC PANEL - Abnormal       Result Value    Sodium 136      Potassium 5.0      Chloride 108      CO2 21      Glucose 93      Blood Urea Nitrogen 15      Creatinine 0.65 (*)     Calcium 9.0      Protein Total 7.4      Albumin 4.5      Globulin 2.9      Albumin/Globulin Ratio 1.6      Bilirubin Total 0.7      ALP 57      ALT 27      AST 39 (*)     eGFR >90      Anion Gap 7.0      BUN/Creatinine Ratio 23 (*)    DRUG SCREEN, URINE (BEAKER) - Abnormal    Amphetamines, Urine No Result      Barbiturates, Urine Negative      Benzodiazepine, Urine Negative      Cannabinoids, Urine Positive (*)     Cocaine, Urine Negative      Opiates, Urine Negative      Phencyclidine, Urine Negative      Methadone, Urine Negative      pH, Urine 6.5      Narrative:     Cut off concentrations:    Amphetamines - 1000 ng/ml  Barbiturates - 200 ng/ml  Benzodiazepine - 200 ng/ml  Cannabinoids (THC) - 50 ng/ml  Cocaine - 300 ng/ml  Fentanyl - 1.0 ng/ml  MDMA - 500 ng/ml  Opiates - 300 ng/ml   Phencyclidine (PCP) - 25 ng/ml  Methadone - 300 ng/ml      False negatives may result form substances such as bleach added to  urine.  False positives may result for the presence of a substance with similar chemical structure to the drug or its metabolite.    This test provides only a PRELIMINARY analytical test result. A more specific alternate chemical method must be used in order to obtain a confirmed analytical result. Gas chromatography/mass spectrometry (GC/MS) is the preferred confirmatory method. Other chemical confirmation methods are available. Clinical consideration and professional judgement should be applied to any drug of abuse test result, particularly when preliminary positive results are used.    Positive results will be confirmed only at the physicians request. Unconfirmed screening results are to be used only for medical purposes (treatment).          ACETAMINOPHEN LEVEL - Abnormal    Acetaminophen Level <10.0 (*)    CBC WITH DIFFERENTIAL - Abnormal    WBC 9.90      RBC 5.01      Hgb 16.7 (*)     Hct 48.8 (*)     MCV 97.4      MCH 33.3      MCHC 34.2      RDW 12.5      Platelet 208      MPV 10.0      Neut % 73.8 (*)     Lymph % 14.7 (*)     Mono % 8.0      Eos % 3.0      Basophil % 0.2      Lymph # 1.46      Neut # 7.30 (*)     Mono # 0.79 (*)     Eos # 0.30      Baso # 0.02      IG# 0.03      IG% 0.3     TSH - Normal    TSH 1.570     URINALYSIS, REFLEX TO URINE CULTURE - Normal    Color, UA Yellow      Appearance, UA Clear      Specific Gravity, UA 1.010      pH, UA 6.5      Protein, UA Negative      Glucose, UA Negative      Ketones, UA Negative      Blood, UA Negative      Bilirubin, UA Negative      Urobilinogen, UA 0.2      Nitrites, UA Negative      Leukocyte Esterase, UA Negative      Narrative:      URINE STABILITY IS 2 HOURS AT ROOM TEMP OR    SIX HOURS REFRIGERATED. PERFORMING TESTING ON    SPECIMENS GREATER THAN THIS AGE MAY AFFECT THE    FOLLOWING TESTS:    PH          SPECIFIC GRAVITY           BLOOD    CLARITY     BILIRUBIN               UROBILINOGEN   ALCOHOL,MEDICAL (ETHANOL) - Normal    Ethanol Level  <10.0      Alcohol, Legal Level <0.010     SALICYLATE LEVEL - Normal    Salicylate Level <1.0     SARS-COV-2 RNA AMPLIFICATION, QUAL - Normal    SARS COV-2 Molecular Negative      Narrative:     The IDNOW COVID-19 assay is a rapid molecular in vitro diagnostic test utilizing an isothermal nucleic acid amplification technology intended for the qualitative detection of nucleic acid from the SARS-CoV-2 viral RNA in direct nasal, nasopharyngeal or throat swabs from individuals who are suspected of COVID-19 by their healthcare provider.   HCG QUALITATIVE URINE - Normal    hCG Qualitative, Urine Negative     CBC W/ AUTO DIFFERENTIAL    Narrative:     The following orders were created for panel order CBC auto differential.  Procedure                               Abnormality         Status                     ---------                               -----------         ------                     CBC with Differential[[id number]]       Abnormal            Final result                 Please view results for these tests on the individual orders.          Imaging Results    None          Medications - No data to display  Medical Decision Making  Brought to the ED with reports of making suicidal comments.  Patient does endorse depression and drug use.  She states she can not recall making those comments, but does not deny them.  Pec signed.  Patient medically cleared and stable for transfer for inpatient psychiatric evaluation    Amount and/or Complexity of Data Reviewed  Independent Historian: EMS     Details: Provides the history of the suicidal comments.  Patient not a reliable historian because she states she can not recall happened.  Admits to drug use around that time  External Data Reviewed: notes.     Details: Past medical history, medications, allergies reviewed  Labs: ordered. Decision-making details documented in ED Course.    Risk  Prescription drug management.  Decision regarding hospitalization.                   Medically cleared for psychiatry placement: 10/4/2024 10:40 AM                   Clinical Impression:  Final diagnoses:  [F32.A, R45.851] Depression with suicidal ideation (Primary)  [F15.10] Methamphetamine abuse          ED Disposition Condition    Transfer to Psych Facility Stable                 Darryl Murcia MD  10/04/24 1054

## 2024-10-04 NOTE — PROGRESS NOTES
Recreation Therapy Assessment    1. MOOD: depressed and low energy    2. BEHAVIOR: cooperative     3. AFFECT:flat and low energy     4. COGNITION: alert  when talk to     5. MOTOR BEHAVIOR/SKILLS: ambulatory    6. SPEECH:normal but low volume    7. LEISURE FUNCTIONING: declined due to SI thoughts and substance abuse    8. LEISURE NEEDS: to regain positive non substance leisure interests at home    9. PT EDUCATION LEVEL: 12 th grade pt states    10. WHAT IS THE BEST THING THAT EVER HAPPENED TO YOU? Pt stated nothing!!  11. THINGS THAT FRUSTRATE AND ANGER ME ARE: when my sister got killed by the [employer] serial killer pt states.    12. WHEN I GET ANGRY, I USUALLY: isolate ,use drugs or go talk to a friend    13. MY RELATIONSHIP WITH MOST PEOPLE ARE: not so good lately.    14. LEISURE INTERESTS/SKILLS: my dogs, fishing,football, and cook outs but not much lately.    15. LEISURE BARRIERS: SI thoughts and using drugs    16. ASSESSMENT SUMMARY: pt is a 35 year old white female.pt lives alone she states. . Pt mother is . Pt dad lives in [facility]. They are not close but she keeps in touch with him. Pt has 2 siblings left . But they are not close. Pt. Is single and lives alone she states. Pt has a job. Pt is a cook and a  . She states. Pt was admitted for SI comments. Pt does states she has been depressed  and withdrawn. Pt states she does not recall some of the things that happen to her.         17. TX PLAN FOR RECREATION THERAPY: pt will receive rt services 2 x a day and 5 x a week with tao DOWS. Pt will be assisted to complete 8 assignments on problem solving skills, emotional outlets, self worth and non substance leisure skills and education. Pt will utilize art,music,cognitive games and exercise to achieve these goals. Assist pt 1;1 as needed to achieve her goals to the best of her ability. All to enhance positive decision making skills, emotional outlets ,self worth and leisure skills  and quality of life at home once dc.       18. SIGNATURE/CREDENTIALS:tao demarco MA CTRS                                                                     DATE: 10  4   2024                             TIME:3:09 am        RECREATION THERAPIST  JUSTIN

## 2024-10-05 PROBLEM — E03.9 ACQUIRED HYPOTHYROIDISM: Status: ACTIVE | Noted: 2024-10-05

## 2024-10-05 PROBLEM — R45.851 SUICIDAL IDEATION: Status: ACTIVE | Noted: 2024-10-05

## 2024-10-05 LAB
CHOLEST SERPL-MCNC: 133 MG/DL
CHOLEST/HDLC SERPL: 4 {RATIO} (ref 0–5)
EST. AVERAGE GLUCOSE BLD GHB EST-MCNC: 85.3 MG/DL
GLUCOSE P FAST SERPL-MCNC: 99 MG/DL (ref 70–100)
HBA1C MFR BLD: 4.6 %
HDLC SERPL-MCNC: 38 MG/DL (ref 35–60)
LDLC SERPL CALC-MCNC: 78 MG/DL (ref 50–140)
POCT GLUCOSE: 84 MG/DL (ref 70–110)
POCT GLUCOSE: 89 MG/DL (ref 70–110)
POCT GLUCOSE: 90 MG/DL (ref 70–110)
T PALLIDUM AB SER QL: NONREACTIVE
TRIGL SERPL-MCNC: 85 MG/DL (ref 37–140)
VLDLC SERPL CALC-MCNC: 17 MG/DL

## 2024-10-05 PROCEDURE — 25000003 PHARM REV CODE 250: Performed by: PSYCHIATRY & NEUROLOGY

## 2024-10-05 PROCEDURE — 83036 HEMOGLOBIN GLYCOSYLATED A1C: CPT | Performed by: PSYCHIATRY & NEUROLOGY

## 2024-10-05 PROCEDURE — 36415 COLL VENOUS BLD VENIPUNCTURE: CPT | Performed by: PSYCHIATRY & NEUROLOGY

## 2024-10-05 PROCEDURE — S4991 NICOTINE PATCH NONLEGEND: HCPCS | Performed by: PSYCHIATRY & NEUROLOGY

## 2024-10-05 PROCEDURE — 86780 TREPONEMA PALLIDUM: CPT | Performed by: PSYCHIATRY & NEUROLOGY

## 2024-10-05 PROCEDURE — 80061 LIPID PANEL: CPT | Performed by: PSYCHIATRY & NEUROLOGY

## 2024-10-05 PROCEDURE — 11400000 HC PSYCH PRIVATE ROOM

## 2024-10-05 PROCEDURE — 82947 ASSAY GLUCOSE BLOOD QUANT: CPT | Performed by: PSYCHIATRY & NEUROLOGY

## 2024-10-05 RX ADMIN — BUSPIRONE HYDROCHLORIDE 10 MG: 10 TABLET ORAL at 08:10

## 2024-10-05 RX ADMIN — IBUPROFEN 400 MG: 400 TABLET, FILM COATED ORAL at 08:10

## 2024-10-05 RX ADMIN — THYROID, PORCINE 30 MG: 30 TABLET ORAL at 06:10

## 2024-10-05 RX ADMIN — LORAZEPAM 1 MG: 1 TABLET ORAL at 08:10

## 2024-10-05 RX ADMIN — CETIRIZINE HYDROCHLORIDE 10 MG: 10 TABLET, FILM COATED ORAL at 08:10

## 2024-10-05 RX ADMIN — HYDROXYZINE PAMOATE 50 MG: 25 CAPSULE ORAL at 08:10

## 2024-10-05 RX ADMIN — NICOTINE 1 PATCH: 21 PATCH, EXTENDED RELEASE TRANSDERMAL at 08:10

## 2024-10-05 RX ADMIN — LORAZEPAM 1 MG: 1 TABLET ORAL at 03:10

## 2024-10-05 RX ADMIN — PANTOPRAZOLE SODIUM 40 MG: 40 TABLET, DELAYED RELEASE ORAL at 08:10

## 2024-10-05 RX ADMIN — BUPROPION HYDROCHLORIDE 300 MG: 300 TABLET, FILM COATED, EXTENDED RELEASE ORAL at 08:10

## 2024-10-05 NOTE — NURSING
"Daily Nursing Note:      Behavior:  Patient (Ashley Clayton is a 34 y.o. female, : 1990, MRN: [id number]) demonstrating an affect that was sad, tearful, and anxious. Ashley demonstrating mood that is depressed and anxious. Ashley had an appearance that was disheveled. Ashley denies suicidal ideation. Ashley denies suicide plan. Ashley denies homicidal ideation. Ashley denies hallucinations.    Ashley's  height is 5' 2" (1.575 m) and weight is 84.2 kg (185 lb 10 oz). Her oral temperature is 98.4 °F (36.9 °C). Her blood pressure is 116/79 and her pulse is 112 (abnormal). Her respiration is 16 and oxygen saturation is 97%. Roberts last BM was noted on: 10/03/2024.      Intervention:  Encouraged Ashley to perform self-hygiene, grooming, and changing of clothing. Monitored Ashley's behavior and program compliance. Monitored Ashley for suicidal ideation, homicidal ideation, sleep disturbance, and hallucinations. Encouraged Ashley to eat all portions of meals and assessed for meal preferences. Monitored Ashley for intake and output to ensure hydration. Will notify the Physician for any medication refusal and any change in patient condition.      Response:  Ashley verbalizes depression and rates it 10/10.  She is sad and tearful.  Intermittent eye contact.  Complains of right shoulder pain 4/10.  Educated on medications ordered for pain and encouraged to request meds when needed.  Verbalized understanding.  Denies auditory hallucinations.  Also denies wanting to harm herself at this time.        Plan:   Continue to monitor per MD orders; maintain patient safety with safety checks Q15 minutes and prn.   " Rifampin Counseling: I discussed with the patient the risks of rifampin including but not limited to liver damage, kidney damage, red-orange body fluids, nausea/vomiting and severe allergy.

## 2024-10-05 NOTE — PLAN OF CARE
Problem: Excessive Substance Use  Goal: Optimized Energy Level (Excessive Substance Use)  Intervention: Optimize Energy Level  Flowsheets (Taken 10/4/2024 2010)  Activity (Behavioral Health): up ad wally  Goal: Improved Behavioral Control (Excessive Substance Use)  Outcome: Progressing  Intervention: Promote Behavior and Impulse Control  Flowsheets (Taken 10/4/2024 2010)  Behavior Management:   boundaries reinforced   impulse control promoted  Goal: Increased Participation and Engagement (Excessive Substance Use)  Outcome: Progressing  Intervention: Facilitate Participation and Engagement  Flowsheets (Taken 10/4/2024 2010)  Supportive Measures:   active listening utilized   positive reinforcement provided   verbalization of feelings encouraged  Goal: Improved Physiologic Symptoms (Excessive Substance Use)  Outcome: Progressing  Intervention: Optimize Physiologic Function  Flowsheets (Taken 10/4/2024 2010)  Nutrition Interventions: food preferences provided  Goal: Enhanced Social, Occupational or Functional Skills (Excessive Substance Use)  Outcome: Progressing  Intervention: Promote Social, Occupational and Functional Ability  Flowsheets (Taken 10/4/2024 2010)  Trust Relationship/Rapport:   care explained   questions answered   thoughts/feelings acknowledged

## 2024-10-05 NOTE — HPI
35 yo female admitted to U for suicidal ideation and comments.  She voices no complaints at this time.  She has h/o DM and hypothyroid.  Currently no N/V/D/C.  No fever/chills.  No chest pain/SOB.  Hospitalist have been consulted for medical evaluation.

## 2024-10-05 NOTE — GROUP NOTE
Nursing Group       Group Focus: Interpersonal communication      Number of patients in attendance: 1    Group Start Time: 1345  Group End Time:  1430  Groups Date: 10/5/2024  Group Topic:  Behavioral Health  Group Department: [facility]  Group Facilitators:  Smita Mclaughlin RN  _____________________________________________________________________    Patient Name: Ashley Clayton  MRN: [id number]  Patient Class: IP- Psych   Admission Date\Time: 10/4/2024 12:50 PM  Hospital Length of Stay: 1  Primary Care Provider: Farhana Osorio FNP     Referred by: Behavioral Medicine Unit Treatment Team     Target symptoms: Substance Abuse, Depression, Anxiety, and Poor Coping Skills     Patient's response to treatment: Active Listening and Self-disclosure     Progress toward goals: Newly admitted     Interval History: Attended and participated with good response     Diagnosis: Depression with SI     Plan: Continue treatment on BMU

## 2024-10-05 NOTE — NURSING
"Daily Nursing Note:      Behavior:    Patient (Ashley Clayton is a 34 y.o. female, : 1990, MRN: [id number]) demonstrating an affect that was sad, flat, and anxious. Ashley demonstrating mood that is depressed and anxious. Ashley had an appearance that was clean. Ashley denies suicidal ideation. Ashley denies suicide plan. Ashley denies homicidal ideation. Ashley denies hallucinations.Mood improved, less irritable. She rates anxiety 4/10, denies depression or SI. Tearful and remorseful. Spoke with her aunt and girlfriend and apologized to both for her behavior. Complained of restless sleep last night, "tossed and turned".     Roberts  height is 5' 2" (1.575 m) and weight is 84.2 kg (185 lb 10 oz). Her oral temperature is 97.7 °F (36.5 °C). Her blood pressure is 113/82 and her pulse is 103. Her respiration is 16 and oxygen saturation is 95%.     Roberts last BM was noted on: 10/3/24      Intervention:    Encourage Ashley to perform self-hygiene, grooming, and changing of clothing. Monitor Ashley's behavior and program compliance. Monitor Ashley for suicidal ideation, homicidal ideation, sleep disturbance, and hallucinations. Encourage Ashley to eat all portions of meals and assess for meal preferences. Monitor Ashley for intake and output to ensure hydration. Notify the Physician for any medication refusal and any change in patient condition.      Response:    Ashley verbalizes understand of unit process and procedures. She is compliant with medications, no adverse effects observed or reported. Dr. Yan visited this morning, H&P done.Will not resume Metformin secondary to A1C level of 4.6. ADA diet and glucose checks bid will continue.Will continue to monitor mood and behavior and offer emotional support.      Plan:     Continue to monitor per MD orders; maintain patient safety. Q 15 min safety checks.  "

## 2024-10-05 NOTE — PLAN OF CARE
Problem: Adult Behavioral Health Plan of Care  Goal: Plan of Care Review  Outcome: Progressing  Goal: Patient-Specific Goal (Individualization)  Outcome: Progressing  Goal: Adheres to Safety Considerations for Self and Others  Outcome: Progressing  Goal: Absence of New-Onset Illness or Injury  Outcome: Progressing  Goal: Optimized Coping Skills in Response to Life Stressors  Outcome: Progressing     Problem: Excessive Substance Use  Goal: Optimized Energy Level (Excessive Substance Use)  Outcome: Progressing  Goal: Improved Behavioral Control (Excessive Substance Use)  Outcome: Not Progressing  Intervention: Promote Behavior and Impulse Control  Flowsheets (Taken 10/5/2024 1743)  Behavior Management: impulse control promoted  Goal: Increased Participation and Engagement (Excessive Substance Use)  Outcome: Progressing  Intervention: Facilitate Participation and Engagement  Flowsheets (Taken 10/5/2024 1743)  Supportive Measures:   active listening utilized   counseling provided   goal-setting facilitated   positive reinforcement provided   verbalization of feelings encouraged  Goal: Improved Physiologic Symptoms (Excessive Substance Use)  Outcome: Not Progressing  Goal: Enhanced Social, Occupational or Functional Skills (Excessive Substance Use)  Outcome: Progressing  Intervention: Promote Social, Occupational and Functional Ability  Flowsheets (Taken 10/5/2024 1743)  Trust Relationship/Rapport:   care explained   choices provided   emotional support provided   empathic listening provided   thoughts/feelings acknowledged     Problem: Depression  Goal: Improved Mood  Outcome: Progressing  Intervention: Monitor and Manage Depressive Symptoms  Flowsheets (Taken 10/5/2024 1743)  Supportive Measures:   active listening utilized   counseling provided   goal-setting facilitated   positive reinforcement provided   verbalization of feelings encouraged     Problem: Anxiety  Goal: Anxiety Reduction or Resolution  Outcome:  Progressing  Intervention: Promote Anxiety Reduction  Flowsheets (Taken 10/5/2024 1509)  Supportive Measures:   active listening utilized   counseling provided   goal-setting facilitated   positive reinforcement provided   verbalization of feelings encouraged     Problem: Glycemic Control Impaired  Goal: Blood Glucose Level Within Targeted Range  Outcome: Progressing  Goal: Minimize Risk of Hypoglycemia  Outcome: Progressing  Intervention: Management and Risk Reduction of Hypoglycemia  Flowsheets (Taken 10/5/2024 9319)  Hypoglycemia Management: blood glucose monitored

## 2024-10-05 NOTE — CONSULTS
[facility] Hospital Medicine  Consult Note    Patient Name: Ashley Clayton  MRN: [id number]  Admission Date: 10/4/2024  Hospital Length of Stay: 1 days  Attending Physician: Darryl Swartz MD   Primary Care Provider: Farhana Osorio FNP           Patient information was obtained from patient, past medical records, and ER records.     Consults  Subjective:     Principal Problem: Suicidal ideation    Chief Complaint: suicidal.       HPI: 33 yo female admitted to [facility] for suicidal ideation and comments.  She voices no complaints at this time.  She has h/o DM and hypothyroid.  Currently no N/V/D/C.  No fever/chills.  No chest pain/SOB.  Hospitalist have been consulted for medical evaluation.      Past Medical History:   Diagnosis Date    Anxiety disorder, unspecified     Bipolar disorder, unspecified     Depression     History of prediabetes     Schizophrenia, unspecified        Past Surgical History:   Procedure Laterality Date    CHOLECYSTECTOMY      ECTOPIC PREGNANCY SURGERY Left     Salpingectomy    TONSILLECTOMY         Review of patient's allergies indicates:   Allergen Reactions    Latex, natural rubber Rash       No current facility-administered medications on file prior to encounter.     Current Outpatient Medications on File Prior to Encounter   Medication Sig    ABILIFY MAINTENA 400 mg sers injection (pre-filled dual chamber) Inject 400 mg into the muscle every 28 days.    buPROPion (WELLBUTRIN XL) 300 MG 24 hr tablet Take 300 mg by mouth once daily.    busPIRone (BUSPAR) 10 MG tablet Take 10 mg by mouth 2 (two) times daily.    ibuprofen (ADVIL,MOTRIN) 600 MG tablet Take 1 tablet (600 mg total) by mouth every 6 (six) hours as needed for Pain.    levocetirizine (XYZAL) 5 MG tablet Take 5 mg by mouth Daily.    metFORMIN (GLUCOPHAGE-XR) 500 MG ER 24hr tablet Take 500 mg by mouth Daily.    NP THYROID 30 mg Tab Take 30 mg by mouth before breakfast.    pantoprazole (PROTONIX) 40  MG tablet Take 40 mg by mouth once daily.    tiZANidine (ZANAFLEX) 4 MG tablet Take 1 tablet (4 mg total) by mouth every 8 (eight) hours as needed (spasms).    atomoxetine (STRATTERA) 40 MG capsule Take 40 mg by mouth every morning.     Family History       Problem Relation (Age of Onset)    Atrial fibrillation Father          Tobacco Use    Smoking status: Every Day     Current packs/day: 0.50     Types: Cigarettes    Smokeless tobacco: Never   Substance and Sexual Activity    Alcohol use: Yes     Comment: occasionally    Drug use: Yes     Types: Methamphetamines, Cocaine, Marijuana    Sexual activity: Yes     Partners: Male, Female     Review of Systems   Constitutional: Negative.    HENT: Negative.     Eyes: Negative.    Respiratory: Negative.     Cardiovascular: Negative.    Gastrointestinal: Negative.    Endocrine: Negative.    Genitourinary: Negative.    Musculoskeletal: Negative.    Skin: Negative.    Allergic/Immunologic: Negative.    Neurological: Negative.    Hematological: Negative.    Psychiatric/Behavioral:  Positive for dysphoric mood and suicidal ideas.      Objective:     Vital Signs (Most Recent):  Temp: 97.7 °F (36.5 °C) (10/05/24 0558)  Pulse: 103 (10/05/24 0558)  Resp: 16 (10/05/24 0558)  BP: 113/82 (10/05/24 0558)  SpO2: 95 % (10/05/24 0558) Vital Signs (24h Range):  Temp:  [97.7 °F (36.5 °C)-98.4 °F (36.9 °C)] 97.7 °F (36.5 °C)  Pulse:  [100-112] 103  Resp:  [16-20] 16  SpO2:  [95 %-97 %] 95 %  BP: (113-116)/(79-91) 113/82     Weight: 84.2 kg (185 lb 10 oz)  Body mass index is 33.95 kg/m².     Physical Exam  Constitutional:       Appearance: Normal appearance. She is obese.   HENT:      Head: Normocephalic and atraumatic.      Nose: Nose normal.      Mouth/Throat:      Mouth: Mucous membranes are moist.      Pharynx: Oropharynx is clear.   Eyes:      Extraocular Movements: Extraocular movements intact and EOM normal.      Conjunctiva/sclera: Conjunctivae normal.      Pupils: Pupils are equal,  round, and reactive to light.   Cardiovascular:      Rate and Rhythm: Normal rate and regular rhythm.      Pulses: Normal pulses.      Heart sounds: Normal heart sounds.   Pulmonary:      Effort: Pulmonary effort is normal.      Breath sounds: Normal breath sounds.   Abdominal:      General: Bowel sounds are normal.      Palpations: Abdomen is soft.   Musculoskeletal:         General: Normal range of motion.      Cervical back: Normal range of motion and neck supple.   Skin:     General: Skin is warm and dry.      Capillary Refill: Capillary refill takes 2 to 3 seconds.   Neurological:      General: No focal deficit present.      Mental Status: She is alert and oriented to person, place, and time. Mental status is at baseline.   Psychiatric:         Mood and Affect: Mood normal.         Speech: Speech normal.         Behavior: Behavior is cooperative.         Thought Content: Thought content includes suicidal ideation.         Cognition and Memory: Cognition and memory normal.         Judgment: Judgment is inappropriate.         CRANIAL NERVES     CN I  cranial nerve I not tested    CN II   Visual fields full to confrontation.     CN III, IV, VI   Pupils are equal, round, and reactive to light.  Extraocular motions are normal.   CN III: no CN III palsy  CN VI: no CN VI palsy    CN V   Facial sensation intact.     CN VII   Facial expression full, symmetric.     CN VIII   CN VIII normal.     CN IX, X   CN IX normal.   CN X normal.     CN XI   CN XI normal.     CN XII   CN XII normal.         Significant Labs: All pertinent labs within the past 24 hours have been reviewed.  BMP:   Recent Labs   Lab 10/04/24  0942      K 5.0      CO2 21   BUN 15   CREATININE 0.65*   CALCIUM 9.0     CBC:   Recent Labs   Lab 10/04/24  0942   WBC 9.90   HGB 16.7*   HCT 48.8*        CMP:   Recent Labs   Lab 10/04/24  0942      K 5.0      CO2 21   BUN 15   CREATININE 0.65*   CALCIUM 9.0   ALBUMIN 4.5   BILITOT  "0.7   ALKPHOS 57   AST 39*   ALT 27     Magnesium: No results for input(s): "MG" in the last 48 hours.    Significant Imaging: I have reviewed all pertinent imaging results/findings within the past 24 hours.  Assessment/Plan:     * Suicidal ideation  Admit to [facility]  Review home meds  OOB  ambulate      Acquired hypothyroidism  Resume meds        VTE Risk Mitigation (From admission, onward)      None          Tobacco abuse-Advised to stop smoking.  Can offer nicotine patch.  She is not interested in stopping(cessation=4mins)      Thank you for your consult. I will sign off. Please contact us if you have any additional questions.    Petar Yan MD  Department of Hospital Medicine   [facility]      "

## 2024-10-05 NOTE — SUBJECTIVE & OBJECTIVE
Past Medical History:   Diagnosis Date    Anxiety disorder, unspecified     Bipolar disorder, unspecified     Depression     History of prediabetes     Schizophrenia, unspecified        Past Surgical History:   Procedure Laterality Date    CHOLECYSTECTOMY      ECTOPIC PREGNANCY SURGERY Left     Salpingectomy    TONSILLECTOMY         Review of patient's allergies indicates:   Allergen Reactions    Latex, natural rubber Rash       No current facility-administered medications on file prior to encounter.     Current Outpatient Medications on File Prior to Encounter   Medication Sig    ABILIFY MAINTENA 400 mg sers injection (pre-filled dual chamber) Inject 400 mg into the muscle every 28 days.    buPROPion (WELLBUTRIN XL) 300 MG 24 hr tablet Take 300 mg by mouth once daily.    busPIRone (BUSPAR) 10 MG tablet Take 10 mg by mouth 2 (two) times daily.    ibuprofen (ADVIL,MOTRIN) 600 MG tablet Take 1 tablet (600 mg total) by mouth every 6 (six) hours as needed for Pain.    levocetirizine (XYZAL) 5 MG tablet Take 5 mg by mouth Daily.    metFORMIN (GLUCOPHAGE-XR) 500 MG ER 24hr tablet Take 500 mg by mouth Daily.    NP THYROID 30 mg Tab Take 30 mg by mouth before breakfast.    pantoprazole (PROTONIX) 40 MG tablet Take 40 mg by mouth once daily.    tiZANidine (ZANAFLEX) 4 MG tablet Take 1 tablet (4 mg total) by mouth every 8 (eight) hours as needed (spasms).    atomoxetine (STRATTERA) 40 MG capsule Take 40 mg by mouth every morning.     Family History       Problem Relation (Age of Onset)    Atrial fibrillation Father          Tobacco Use    Smoking status: Every Day     Current packs/day: 0.50     Types: Cigarettes    Smokeless tobacco: Never   Substance and Sexual Activity    Alcohol use: Yes     Comment: occasionally    Drug use: Yes     Types: Methamphetamines, Cocaine, Marijuana    Sexual activity: Yes     Partners: Male, Female     Review of Systems   Constitutional: Negative.    HENT: Negative.     Eyes: Negative.     Respiratory: Negative.     Cardiovascular: Negative.    Gastrointestinal: Negative.    Endocrine: Negative.    Genitourinary: Negative.    Musculoskeletal: Negative.    Skin: Negative.    Allergic/Immunologic: Negative.    Neurological: Negative.    Hematological: Negative.    Psychiatric/Behavioral:  Positive for dysphoric mood and suicidal ideas.      Objective:     Vital Signs (Most Recent):  Temp: 97.7 °F (36.5 °C) (10/05/24 0558)  Pulse: 103 (10/05/24 0558)  Resp: 16 (10/05/24 0558)  BP: 113/82 (10/05/24 0558)  SpO2: 95 % (10/05/24 0558) Vital Signs (24h Range):  Temp:  [97.7 °F (36.5 °C)-98.4 °F (36.9 °C)] 97.7 °F (36.5 °C)  Pulse:  [100-112] 103  Resp:  [16-20] 16  SpO2:  [95 %-97 %] 95 %  BP: (113-116)/(79-91) 113/82     Weight: 84.2 kg (185 lb 10 oz)  Body mass index is 33.95 kg/m².     Physical Exam  Constitutional:       Appearance: Normal appearance. She is obese.   HENT:      Head: Normocephalic and atraumatic.      Nose: Nose normal.      Mouth/Throat:      Mouth: Mucous membranes are moist.      Pharynx: Oropharynx is clear.   Eyes:      Extraocular Movements: Extraocular movements intact and EOM normal.      Conjunctiva/sclera: Conjunctivae normal.      Pupils: Pupils are equal, round, and reactive to light.   Cardiovascular:      Rate and Rhythm: Normal rate and regular rhythm.      Pulses: Normal pulses.      Heart sounds: Normal heart sounds.   Pulmonary:      Effort: Pulmonary effort is normal.      Breath sounds: Normal breath sounds.   Abdominal:      General: Bowel sounds are normal.      Palpations: Abdomen is soft.   Musculoskeletal:         General: Normal range of motion.      Cervical back: Normal range of motion and neck supple.   Skin:     General: Skin is warm and dry.      Capillary Refill: Capillary refill takes 2 to 3 seconds.   Neurological:      General: No focal deficit present.      Mental Status: She is alert and oriented to person, place, and time. Mental status is at  "baseline.   Psychiatric:         Mood and Affect: Mood normal.         Speech: Speech normal.         Behavior: Behavior is cooperative.         Thought Content: Thought content includes suicidal ideation.         Cognition and Memory: Cognition and memory normal.         Judgment: Judgment is inappropriate.         CRANIAL NERVES     CN I  cranial nerve I not tested    CN II   Visual fields full to confrontation.     CN III, IV, VI   Pupils are equal, round, and reactive to light.  Extraocular motions are normal.   CN III: no CN III palsy  CN VI: no CN VI palsy    CN V   Facial sensation intact.     CN VII   Facial expression full, symmetric.     CN VIII   CN VIII normal.     CN IX, X   CN IX normal.   CN X normal.     CN XI   CN XI normal.     CN XII   CN XII normal.         Significant Labs: All pertinent labs within the past 24 hours have been reviewed.  BMP:   Recent Labs   Lab 10/04/24  0942      K 5.0      CO2 21   BUN 15   CREATININE 0.65*   CALCIUM 9.0     CBC:   Recent Labs   Lab 10/04/24  0942   WBC 9.90   HGB 16.7*   HCT 48.8*        CMP:   Recent Labs   Lab 10/04/24  0942      K 5.0      CO2 21   BUN 15   CREATININE 0.65*   CALCIUM 9.0   ALBUMIN 4.5   BILITOT 0.7   ALKPHOS 57   AST 39*   ALT 27     Magnesium: No results for input(s): "MG" in the last 48 hours.    Significant Imaging: I have reviewed all pertinent imaging results/findings within the past 24 hours.  "

## 2024-10-06 LAB
POCT GLUCOSE: 100 MG/DL (ref 70–110)
POCT GLUCOSE: 107 MG/DL (ref 70–110)

## 2024-10-06 PROCEDURE — S4991 NICOTINE PATCH NONLEGEND: HCPCS | Performed by: PSYCHIATRY & NEUROLOGY

## 2024-10-06 PROCEDURE — 25000003 PHARM REV CODE 250: Performed by: PSYCHIATRY & NEUROLOGY

## 2024-10-06 PROCEDURE — 11400000 HC PSYCH PRIVATE ROOM

## 2024-10-06 RX ORDER — MUPIROCIN 20 MG/G
OINTMENT TOPICAL 2 TIMES DAILY
Status: DISCONTINUED | OUTPATIENT
Start: 2024-10-06 | End: 2024-10-10 | Stop reason: HOSPADM

## 2024-10-06 RX ORDER — PRAZOSIN HYDROCHLORIDE 1 MG/1
2 CAPSULE ORAL NIGHTLY
Status: DISCONTINUED | OUTPATIENT
Start: 2024-10-06 | End: 2024-10-10 | Stop reason: HOSPADM

## 2024-10-06 RX ORDER — LORAZEPAM 0.5 MG/1
0.5 TABLET ORAL 3 TIMES DAILY
Status: DISCONTINUED | OUTPATIENT
Start: 2024-10-06 | End: 2024-10-08

## 2024-10-06 RX ADMIN — PANTOPRAZOLE SODIUM 40 MG: 40 TABLET, DELAYED RELEASE ORAL at 08:10

## 2024-10-06 RX ADMIN — LORAZEPAM 0.5 MG: 0.5 TABLET ORAL at 08:10

## 2024-10-06 RX ADMIN — IBUPROFEN 400 MG: 400 TABLET, FILM COATED ORAL at 11:10

## 2024-10-06 RX ADMIN — NICOTINE 1 PATCH: 21 PATCH, EXTENDED RELEASE TRANSDERMAL at 08:10

## 2024-10-06 RX ADMIN — CETIRIZINE HYDROCHLORIDE 10 MG: 10 TABLET, FILM COATED ORAL at 08:10

## 2024-10-06 RX ADMIN — IBUPROFEN 400 MG: 400 TABLET, FILM COATED ORAL at 08:10

## 2024-10-06 RX ADMIN — PRAZOSIN HYDROCHLORIDE 2 MG: 1 CAPSULE ORAL at 08:10

## 2024-10-06 RX ADMIN — BUPROPION HYDROCHLORIDE 300 MG: 300 TABLET, FILM COATED, EXTENDED RELEASE ORAL at 08:10

## 2024-10-06 RX ADMIN — LORAZEPAM 1 MG: 1 TABLET ORAL at 03:10

## 2024-10-06 RX ADMIN — THYROID, PORCINE 30 MG: 30 TABLET ORAL at 06:10

## 2024-10-06 RX ADMIN — LORAZEPAM 1 MG: 1 TABLET ORAL at 08:10

## 2024-10-06 RX ADMIN — BUSPIRONE HYDROCHLORIDE 10 MG: 10 TABLET ORAL at 08:10

## 2024-10-06 RX ADMIN — IBUPROFEN 400 MG: 400 TABLET, FILM COATED ORAL at 05:10

## 2024-10-06 RX ADMIN — BUSPIRONE HYDROCHLORIDE 10 MG: 10 TABLET ORAL at 09:10

## 2024-10-06 NOTE — PLAN OF CARE
Problem: Adult Behavioral Health Plan of Care  Goal: Optimized Coping Skills in Response to Life Stressors  Outcome: Progressing     Problem: Depression  Goal: Improved Mood  Outcome: Progressing     Problem: Anxiety  Goal: Anxiety Reduction or Resolution  Outcome: Progressing     Problem: Glycemic Control Impaired  Goal: Blood Glucose Level Within Targeted Range  Outcome: Progressing  Goal: Minimize Risk of Hypoglycemia  Outcome: Progressing

## 2024-10-06 NOTE — NURSING
"Daily Nursing Note:      Behavior:    Patient (Ashley Clayton is a 34 y.o. female, : 1990, MRN: [id number]) demonstrating an affect that was sad, flat, and anxious. Ashley demonstrating mood that is depressed and anxious. Ashley had an appearance that was disheveled. Ashley denies suicidal ideation. Ashley denies suicide plan. Ashley denies homicidal ideation. Ashley denies hallucinations. Constricted and withdrawn with repetitive anxious and somatic complaints. Reports to poor sleep, "tossed and turned". Trazadone has been effective in the past. She denies anxiety or depression rating both 0/10.    Ashley's  height is 5' 2" (1.575 m) and weight is 83 kg (182 lb 15.7 oz). Her oral temperature is 98.2 °F (36.8 °C). Her blood pressure is 107/76 and her pulse is 105. Her respiration is 18 and oxygen saturation is 95%.     Roberts last BM was noted on: 10/5/24      Intervention:    Encourage Ashley to perform self-hygiene, grooming, and changing of clothing. Monitor Ashley's behavior and program compliance. Monitor Ashley for suicidal ideation, homicidal ideation, sleep disturbance, and hallucinations. Encourage Ashley to eat all portions of meals and assess for meal preferences. Monitor Ashley for intake and output to ensure hydration. Notify the Physician for any medication refusal and any change in patient condition.      Response:    Ashley verbalizes understand of unit process and procedures. Ashley is compliant with medications, no adverse effects observed or reported.      Plan:     Continue to monitor per MD orders; maintain patient safety. Q 15 min safety checks.  "

## 2024-10-06 NOTE — NURSING
"Daily Nursing Note:      Behavior:  Patient (Ashley Clayton is a 34 y.o. female, : 1990, MRN: [id number]) demonstrating an affect that was sad and anxious. Ashley demonstrating mood that is depressed and anxious. Ashley had an appearance that was disheveled. Ashley denies suicidal ideation. Ashley denies suicide plan. Ashley denies homicidal ideation. Ashley denies hallucinations.    Ashley's  height is 5' 2" (1.575 m) and weight is 84.2 kg (185 lb 10 oz). Her oral temperature is 98.3 °F (36.8 °C). Her blood pressure is 118/81 and her pulse is 89. Her respiration is 16 and oxygen saturation is 95%. Roberts last BM was noted on: 10/5/2024.    Intervention:  Encouraged Ashley to perform self-hygiene, grooming, and changing of clothing. Monitored Ashley's behavior and program compliance. Monitored Ashley for suicidal ideation, homicidal ideation, sleep disturbance, and hallucinations. Encouraged Ashley to eat all portions of meals and assessed for meal preferences. Monitored Ashley for intake and output to ensure hydration. Will notify the Physician for any medication refusal and any change in patient condition.      Response:  Ashley verbalizes anxiety and depression.  Rates anxiety 5/10 and depression 2/10.  Given Vistaril for increasing anxiety at .   Compliant with pm medications.  Up for snack.  Avoidant and guarded.  Somatic complaints.      Plan:   Continue to monitor per MD orders; maintain patient safety with safety checks Q15 minutes and prn.   "

## 2024-10-06 NOTE — GROUP NOTE
Group Psychotherapy       Group Focus: Promoting Healthy Lifestyles      Number of patients in attendance: 4    Group Start Time: 1345  Group End Time:  1430  Groups Date: 10/6/2024  Group Topic:  Behavioral Health  Group Department: [facility]  Group Facilitators:  John Gregory LPN  _____________________________________________________________________    Patient Name: Ashley Clayton  MRN: [id number]  Patient Class: IP- Psych   Admission Date\Time: 10/4/2024 12:50 PM  Hospital Length of Stay: 2  Primary Care Provider: Farhana Osorio FNP     Referred by: Behavioral Medicine Unit Treatment Team     Target symptoms: Depression, Anxiety, and Mood Disorder     Patient's response to treatment: Active Listening and Self-disclosure     Progress toward goals: Progressing adequately     Interval History: Participated with good interaction     Diagnosis: depression     Plan: Continue treatment on BMU

## 2024-10-06 NOTE — PLAN OF CARE
Problem: Excessive Substance Use  Goal: Optimized Energy Level (Excessive Substance Use)  Outcome: Progressing  Intervention: Optimize Energy Level  Flowsheets (Taken 10/5/2024 2015)  Activity (Behavioral Health): up ad wally  Goal: Improved Behavioral Control (Excessive Substance Use)  Outcome: Progressing  Intervention: Promote Behavior and Impulse Control  Flowsheets (Taken 10/5/2024 2015)  Behavior Management:   boundaries reinforced   impulse control promoted  Goal: Increased Participation and Engagement (Excessive Substance Use)  Outcome: Progressing  Intervention: Facilitate Participation and Engagement  Flowsheets (Taken 10/5/2024 2015)  Supportive Measures:   active listening utilized   positive reinforcement provided   verbalization of feelings encouraged  Goal: Improved Physiologic Symptoms (Excessive Substance Use)  Outcome: Progressing  Intervention: Optimize Physiologic Function  Flowsheets (Taken 10/5/2024 2015)  Nutrition Interventions: food preferences provided  Goal: Enhanced Social, Occupational or Functional Skills (Excessive Substance Use)  Outcome: Progressing  Intervention: Promote Social, Occupational and Functional Ability  Flowsheets (Taken 10/5/2024 2015)  Trust Relationship/Rapport:   care explained   questions answered   thoughts/feelings acknowledged     Problem: Excessive Substance Use  Goal: Optimized Energy Level (Excessive Substance Use)  Outcome: Progressing  Intervention: Optimize Energy Level  Flowsheets (Taken 10/5/2024 2015)  Activity (Behavioral Health): up ad wally  Goal: Improved Behavioral Control (Excessive Substance Use)  Outcome: Progressing  Intervention: Promote Behavior and Impulse Control  Flowsheets (Taken 10/5/2024 2015)  Behavior Management:   boundaries reinforced   impulse control promoted  Goal: Increased Participation and Engagement (Excessive Substance Use)  Outcome: Progressing  Intervention: Facilitate Participation and Engagement  Flowsheets (Taken 10/5/2024  2015)  Supportive Measures:   active listening utilized   positive reinforcement provided   verbalization of feelings encouraged  Goal: Improved Physiologic Symptoms (Excessive Substance Use)  Outcome: Progressing  Intervention: Optimize Physiologic Function  Flowsheets (Taken 10/5/2024 2015)  Nutrition Interventions: food preferences provided  Goal: Enhanced Social, Occupational or Functional Skills (Excessive Substance Use)  Outcome: Progressing  Intervention: Promote Social, Occupational and Functional Ability  Flowsheets (Taken 10/5/2024 2015)  Trust Relationship/Rapport:   care explained   questions answered   thoughts/feelings acknowledged

## 2024-10-07 PROBLEM — F43.10 PTSD (POST-TRAUMATIC STRESS DISORDER): Status: ACTIVE | Noted: 2024-10-07

## 2024-10-07 PROBLEM — F15.20 SEVERE STIMULANT USE DISORDER: Status: ACTIVE | Noted: 2024-10-07

## 2024-10-07 PROBLEM — F17.200 TOBACCO USE DISORDER, SEVERE, DEPENDENCE: Status: ACTIVE | Noted: 2024-10-07

## 2024-10-07 PROBLEM — F31.9 BIPOLAR AFFECTIVE DISORDER: Status: ACTIVE | Noted: 2024-10-07

## 2024-10-07 PROBLEM — F12.20 CANNABIS USE DISORDER, SEVERE, DEPENDENCE: Status: ACTIVE | Noted: 2024-10-07

## 2024-10-07 LAB — POCT GLUCOSE: 104 MG/DL (ref 70–110)

## 2024-10-07 PROCEDURE — 25000003 PHARM REV CODE 250: Performed by: PSYCHIATRY & NEUROLOGY

## 2024-10-07 PROCEDURE — S4991 NICOTINE PATCH NONLEGEND: HCPCS | Performed by: PSYCHIATRY & NEUROLOGY

## 2024-10-07 PROCEDURE — 11400000 HC PSYCH PRIVATE ROOM

## 2024-10-07 PROCEDURE — 25000003 PHARM REV CODE 250: Performed by: INTERNAL MEDICINE

## 2024-10-07 RX ADMIN — THYROID, PORCINE 30 MG: 30 TABLET ORAL at 06:10

## 2024-10-07 RX ADMIN — IBUPROFEN 400 MG: 400 TABLET, FILM COATED ORAL at 06:10

## 2024-10-07 RX ADMIN — PANTOPRAZOLE SODIUM 40 MG: 40 TABLET, DELAYED RELEASE ORAL at 08:10

## 2024-10-07 RX ADMIN — MUPIROCIN 1 G: 20 OINTMENT TOPICAL at 08:10

## 2024-10-07 RX ADMIN — LORAZEPAM 0.5 MG: 0.5 TABLET ORAL at 03:10

## 2024-10-07 RX ADMIN — LORAZEPAM 0.5 MG: 0.5 TABLET ORAL at 08:10

## 2024-10-07 RX ADMIN — CETIRIZINE HYDROCHLORIDE 10 MG: 10 TABLET, FILM COATED ORAL at 08:10

## 2024-10-07 RX ADMIN — BUPROPION HYDROCHLORIDE 300 MG: 300 TABLET, FILM COATED, EXTENDED RELEASE ORAL at 08:10

## 2024-10-07 RX ADMIN — BUSPIRONE HYDROCHLORIDE 10 MG: 10 TABLET ORAL at 08:10

## 2024-10-07 RX ADMIN — HYDROXYZINE PAMOATE 50 MG: 25 CAPSULE ORAL at 12:10

## 2024-10-07 RX ADMIN — NICOTINE 1 PATCH: 21 PATCH, EXTENDED RELEASE TRANSDERMAL at 08:10

## 2024-10-07 RX ADMIN — PRAZOSIN HYDROCHLORIDE 2 MG: 1 CAPSULE ORAL at 08:10

## 2024-10-07 NOTE — PLAN OF CARE
Problem: Excessive Substance Use  Goal: Optimized Energy Level (Excessive Substance Use)  Outcome: Progressing  Intervention: Optimize Energy Level  Flowsheets (Taken 10/6/2024 2030)  Activity (Behavioral Health): up ad wally  Goal: Improved Behavioral Control (Excessive Substance Use)  Outcome: Progressing  Intervention: Promote Behavior and Impulse Control  Flowsheets (Taken 10/6/2024 2030)  Behavior Management:   boundaries reinforced   impulse control promoted  Goal: Increased Participation and Engagement (Excessive Substance Use)  Outcome: Progressing  Intervention: Facilitate Participation and Engagement  Flowsheets (Taken 10/6/2024 2030)  Supportive Measures:   active listening utilized   positive reinforcement provided   verbalization of feelings encouraged  Goal: Improved Physiologic Symptoms (Excessive Substance Use)  Outcome: Progressing  Intervention: Optimize Physiologic Function  Flowsheets (Taken 10/6/2024 2030)  Nutrition Interventions: food preferences provided  Goal: Enhanced Social, Occupational or Functional Skills (Excessive Substance Use)  Outcome: Progressing  Intervention: Promote Social, Occupational and Functional Ability  Flowsheets (Taken 10/6/2024 2030)  Trust Relationship/Rapport:   care explained   questions answered   thoughts/feelings acknowledged

## 2024-10-07 NOTE — GROUP NOTE
Group Psychotherapy       Group Focus: Promoting Healthy Lifestyles      Number of patients in attendance: 3    Group Start Time: 1615  Group End Time:  1700  Groups Date: 10/7/2024  Group Topic:  Behavioral Health  Group Department: [facility]  Group Facilitators:  Roshni Curry RN  _____________________________________________________________________    Patient Name: Ashley Clayton  MRN: [id number]  Patient Class: IP- Psych   Admission Date\Time: 10/4/2024 12:50 PM  Hospital Length of Stay: 3  Primary Care Provider: Farhana Osorio FNP     Referred by: Behavioral Medicine Unit Treatment Team     Target symptoms: Mood Disorder     Patient's response to treatment: Active Listening and Self-disclosure     Progress toward goals: Progressing well     Interval History:  attended and participated in group    Diagnosis: bipolar affective disorder.     Plan: Continue treatment on [facility]

## 2024-10-07 NOTE — PROGRESS NOTES
Recreation Therapy Progress Note    Date: 10  7  2024    Time: 1400    Group Title: leisure skills    Mood: depressed and low energy    Behavior: pt participated in group    Affect: depressed and low energy     Speech: pt very quiet    Cognition: alert  but seems very distracted in thought process at intervals.    Participation Level: 100% in group . Pt needed prompts to interact verbally.     Intervention:cont rt services.           Recreation Therapist   Signature: tao DOWS

## 2024-10-07 NOTE — ASSESSMENT & PLAN NOTE
1. Patient was time undergo suicide risk assessments patient was evaluated for strategies to help patient was develop improved coping skills to deal with the thoughts to self-injurious self-harm.

## 2024-10-07 NOTE — SUBJECTIVE & OBJECTIVE
Interval History:  Hospital day 3.     34-year-old white female with a history of previous evaluation in the past 24 hours who at this time presents ongoing challenges in terms of mood mood instability.  Patient was this time presents overall history of which she has been using methamphetamines, has a strong history of trauma and increasing levels of mood instability.  Patient was apparently does has a history of becoming elevated her mood previously carried a diagnosis of bipolar affective disorder.    Patient was most recently assessed in the past 24 hours and clearly shows evidence of persistent ongoing mood instability but attempts to minimized level of severity distress at this time.      Patient was this time was rechallenge with trials of medications.  She was currently being placed on trials of benzodiazepines as well as Minipress to help mitigate difficulties in terms of withdrawal from psychostimulants as well as use of Minipress to help facilitate reductions in traumatic dream material.    Family History       Problem Relation (Age of Onset)    Atrial fibrillation Father          Tobacco Use    Smoking status: Every Day     Current packs/day: 0.50     Types: Cigarettes    Smokeless tobacco: Never   Substance and Sexual Activity    Alcohol use: Yes     Comment: occasionally    Drug use: Yes     Types: Methamphetamines, Cocaine, Marijuana    Sexual activity: Yes     Partners: Male, Female     Psychotherapeutics (From admission, onward)      Start     Stop Route Frequency Ordered    10/18/24 0900  ARIPiprazole (ABILIFY) injection (single-use vial) 400 mg         -- IM Every 28 days 10/04/24 2021    10/06/24 2100  LORazepam tablet 0.5 mg         -- Oral 3 times daily 10/06/24 1855    10/05/24 0900  buPROPion 24 hr tablet 300 mg         -- Oral Daily 10/04/24 2021    10/04/24 2130  busPIRone tablet 10 mg         -- Oral 2 times daily 10/04/24 2021    10/04/24 1822  OLANZapine zydis disintegrating tablet 10 mg   "       -- Oral Every 8 hours PRN 10/04/24 1723             Review of Systems  Objective:     Vital Signs (Most Recent):  Temp: 98 °F (36.7 °C) (10/07/24 0601)  Pulse: (!) 111 (10/07/24 0601)  Resp: 16 (10/07/24 0601)  BP: 112/80 (10/07/24 0601)  SpO2: 97 % (10/07/24 0601) Vital Signs (24h Range):  Temp:  [98 °F (36.7 °C)-98.2 °F (36.8 °C)] 98 °F (36.7 °C)  Pulse:  [100-111] 111  Resp:  [16-18] 16  SpO2:  [97 %] 97 %  BP: (112-115)/(80-81) 112/80     Height: 5' 2" (157.5 cm)  Weight: 83 kg (182 lb 15.7 oz)  Body mass index is 33.47 kg/m².    No intake or output data in the 24 hours ending 10/07/24 1612     Physical Exam    Mental status examination: 34-year-old white female whose affect at this time was blunted.  Who appears to be sullen and sad at this time whose speech was good articulation and execution.  His thought processes this time were non spontaneous.  His thought content demonstrated no clear evidence of any auditory or visual hallucinations.  Patient was this time minimized any statements intentions to self injure.  She continues to show evidence of ruminations about past events prior losses but mental active statements to self injure.  Orientation was intact.        Significant Labs: Last 72 Hours:   Recent Lab Results  (Last 5 results in the past 72 hours)        10/07/24  0531   10/06/24  1640   10/06/24  0533   10/05/24  1644   10/05/24  0558        Cholesterol Total         133       Estimated Avg Glucose         85.3       Gluc Fast         99       HDL         38       Hemoglobin A1C External         4.6       LDL Cholesterol         78.00       POCT Glucose 104   107   100   89         Syphilis Antibody         Nonreactive       Total Cholesterol/HDL Ratio         4       Triglycerides         85       Very Low Density Lipoprotein         17                              Significant Imaging: None  "
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
## 2024-10-07 NOTE — ASSESSMENT & PLAN NOTE
1. Patient was be educated about overall contraindications use of cannabis issues of chronic instability as well as issues of trauma.  2.  Patient was encouraged to strive towards abstinence

## 2024-10-07 NOTE — NURSING
"Daily Nursing Note:      Behavior:    Patient (Ashley Clayton is a 34 y.o. female, : 1990, MRN: [id number]) demonstrating an affect that was sad, anxious, and ....blunted   Ashley demonstrating mood that is depressed and anxious. Ashley had an appearance that was disheveled. Ashley denies suicidal ideation. Ashley denies suicide plan. Ashley denies homicidal ideation. Ashley denies hallucinations.    Ashley's  height is 5' 2" (1.575 m) and weight is 83 kg (182 lb 15.7 oz). Her oral temperature is 98 °F (36.7 °C). Her blood pressure is 112/80 and her pulse is 111 (abnormal). Her respiration is 16 and oxygen saturation is 97%.     Roberts last BM was noted on: 10/6/2024.      Intervention:    Encourage Ashley to perform self-hygiene, grooming, and changing of clothing. Monitor Ashley's behavior and program compliance. Monitor Ashley for suicidal ideation, homicidal ideation, sleep disturbance, and hallucinations. Encourage Ashley to eat all portions of meals and assess for meal preferences.  Ensure hydration by offering fluids. Notify the Physician for any medication refusal and any change in patient condition.      Response:    Ashley verbalizes understand of unit process and procedures. Ashley is compliant with medications and treatment.    Plan:     Continue to monitor per MD orders; maintain patient safety. Q15min safety checks. Suicide precautions.  "

## 2024-10-07 NOTE — ASSESSMENT & PLAN NOTE
1. Patient was to be placed on Ativan detoxification.  Patient was to be evaluated for management forward.    2. Referral engaging with the patient was into some form IOP level of care versus residential substance treatment we will need to explore.
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
## 2024-10-07 NOTE — NURSING
Per Dr Yan, Ok to discontinue accuchecks,  All CBG results for the past 4 days have been within normal range.
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
## 2024-10-07 NOTE — GROUP NOTE
Group Psychotherapy       Group Focus: Promoting Healthy Lifestyles      Number of patients in attendance: 1    Group Start Time: 0900  Group End Time:  0930  Groups Date: 10/7/2024  Group Topic:  Behavioral Health  Group Department: [facility] - Behavioral Health Unit  Group Facilitators:  Ralph Patel LCSW  _____________________________________________________________________    Patient Name: Ashley Clayton  MRN: [id number]  Patient Class: IP- Psych   Admission Date\Time: 10/4/2024 12:50 PM  Hospital Length of Stay: 3  Primary Care Provider: Farhana Osorio FNP     Referred by: Behavioral Medicine Unit Treatment Team     Target symptoms: Substance Abuse and Depression     Patient's response to treatment: Active Listening and Self-disclosure     Progress toward goals: Progressing adequately     Interval History: Pt talked about her plan to remain sober.  Wants IOP     Diagnosis:      Plan: Continue treatment on BMU

## 2024-10-07 NOTE — ASSESSMENT & PLAN NOTE
1. Patient was this time maintained on trials of Wellbutrin  mg p.o. q.day.  2.  Patient was deemed received Abilify Maintena 400 mg IM on 09/18/2024 such that we will not due to the 18th.  Maintain this time.  3.  Continue trials of BuSpar 10 mg p.o. t.i.d..    4. At this time have recommended institutional trials of Minipress at hours sleep facilitate improved induction asleep and reductions overall dream material.

## 2024-10-07 NOTE — PROGRESS NOTES
Recreation Therapy Progress Note    Date: 10  7  2024    Time: 10:00 am group    Group Title: creative expression    Mood: quiet , depressed    Behavior: motivation needed to attend group     Affect: flat and depressed    Speech: pt quiet    Cognition: alert and was able to focus    Participation Level: 100% on assignment  with staff prompts at intervals    Intervention:cont rt services.           Recreation Therapist   Signature: tao demarco MA CTRS

## 2024-10-07 NOTE — ASSESSMENT & PLAN NOTE
1. Patient was this time maintained on trials of Wellbutrin  mg p.o. q.day.  2.  Patient was deemed received Abilify Maintena 400 mg IM on 09/18/2024 such that we will not due to the 18th.  Maintain this time.  3.  Continue trials of BuSpar 10 mg p.o. t.i.d..    4. We will re-evaluate patient was regards to all medications for management asleep.  Patient was reports complicated asleep traumatic dream material.
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
## 2024-10-07 NOTE — NURSING
"Daily Nursing Note:      Behavior:  Patient (Ashley Clayton is a 34 y.o. female, : 1990, MRN: [id number]) demonstrating an affect that was sad and flat. Ashley demonstrating mood that is depressed. Ashley had an appearance that was clean. Ashley denies suicidal ideation. Ashley denies suicide plan. Ashley denies homicidal ideation. Ashley denies hallucinations.    Ashley's  height is 5' 2" (1.575 m) and weight is 83 kg (182 lb 15.7 oz). Her oral temperature is 98.2 °F (36.8 °C). Her blood pressure is 115/81 and her pulse is 100. Her respiration is 18 and oxygen saturation is 97%. Roberts last BM was noted on: 10/6/2024.    Intervention:  Encouraged Ashley to perform self-hygiene, grooming, and changing of clothing. Monitored Ashley's behavior and program compliance. Monitored Ashley for suicidal ideation, homicidal ideation, sleep disturbance, and hallucinations. Encouraged Ashley to eat all portions of meals and assesse for meal preferences. Monitored Ashley for intake and output to ensure hydration. Will notify the Physician for any medication refusal and any change in patient condition.      Response:  Ashley endorses depression and back pain.  Rates her depression 4/10 and rates her back pain 7/10.  Ibuprofen given for back pain @ . Denies anxiety. Flat avoidant.  Preoccupied with phone and calling girlfriend.  Less tearful this evening.      Plan:   Continue to monitor per MD orders; maintain patient safety with safety checks Q15 minutes and prn.   "

## 2024-10-08 PROCEDURE — 11400000 HC PSYCH PRIVATE ROOM

## 2024-10-08 PROCEDURE — 25000003 PHARM REV CODE 250: Performed by: PSYCHIATRY & NEUROLOGY

## 2024-10-08 PROCEDURE — 25000003 PHARM REV CODE 250: Performed by: INTERNAL MEDICINE

## 2024-10-08 RX ORDER — BUSPIRONE HYDROCHLORIDE 10 MG/1
10 TABLET ORAL 3 TIMES DAILY
Status: DISCONTINUED | OUTPATIENT
Start: 2024-10-08 | End: 2024-10-10 | Stop reason: HOSPADM

## 2024-10-08 RX ORDER — LORAZEPAM 0.5 MG/1
0.5 TABLET ORAL 2 TIMES DAILY
Status: COMPLETED | OUTPATIENT
Start: 2024-10-09 | End: 2024-10-09

## 2024-10-08 RX ADMIN — CETIRIZINE HYDROCHLORIDE 10 MG: 10 TABLET, FILM COATED ORAL at 08:10

## 2024-10-08 RX ADMIN — THYROID, PORCINE 30 MG: 30 TABLET ORAL at 06:10

## 2024-10-08 RX ADMIN — PRAZOSIN HYDROCHLORIDE 2 MG: 1 CAPSULE ORAL at 08:10

## 2024-10-08 RX ADMIN — BUSPIRONE HYDROCHLORIDE 10 MG: 10 TABLET ORAL at 08:10

## 2024-10-08 RX ADMIN — IBUPROFEN 400 MG: 400 TABLET, FILM COATED ORAL at 08:10

## 2024-10-08 RX ADMIN — LORAZEPAM 0.5 MG: 0.5 TABLET ORAL at 02:10

## 2024-10-08 RX ADMIN — MUPIROCIN 1 G: 20 OINTMENT TOPICAL at 08:10

## 2024-10-08 RX ADMIN — HYDROXYZINE PAMOATE 50 MG: 25 CAPSULE ORAL at 08:10

## 2024-10-08 RX ADMIN — IBUPROFEN 400 MG: 400 TABLET, FILM COATED ORAL at 06:10

## 2024-10-08 RX ADMIN — BUPROPION HYDROCHLORIDE 300 MG: 300 TABLET, FILM COATED, EXTENDED RELEASE ORAL at 08:10

## 2024-10-08 RX ADMIN — PANTOPRAZOLE SODIUM 40 MG: 40 TABLET, DELAYED RELEASE ORAL at 08:10

## 2024-10-08 RX ADMIN — LORAZEPAM 0.5 MG: 0.5 TABLET ORAL at 08:10

## 2024-10-08 NOTE — NURSING
"Daily Nursing Note:      Behavior:    Patient (Ashley Clayton is a 34 y.o. female, : 1990, MRN: [id number]) demonstrating an affect that was sad and anxious. Ashley demonstrating mood that is anxious. Ashley had an appearance that was disheveled. Ashley denies suicidal ideation. Ashley denies suicide plan. Ashley denies homicidal ideation. Ashley denies hallucinations.    Ashley's  height is 5' 2" (1.575 m) and weight is 83 kg (182 lb 15.7 oz). Her oral temperature is 98.1 °F (36.7 °C). Her blood pressure is 120/84 and her pulse is 92. Her respiration is 16 and oxygen saturation is 98%.       Intervention:    Encourage Ashley to perform self-hygiene, grooming, and changing of clothing. Monitor Ashley's behavior and program compliance. Monitor Ashley for suicidal ideation, homicidal ideation, sleep disturbance, and hallucinations. Encourage Ashley to eat all portions of meals and assess for meal preferences. Monitor Ashley for intake and output to ensure hydration. Notify the Physician/Physician Assistant/Advance Practice Registered Nurse (MD/PA/APRN) for any medication refusal and any change in patient condition.      Response:    Ashley verbalizes understand of unit process and procedures.     Plan:     Continue to monitor per MD/PA/APRN orders; maintain patient safety.  "

## 2024-10-08 NOTE — PROGRESS NOTES
Recreation Therapy Progress Note    Date: 10  8  2024    Time:   1400  Group Title: leisure skills    Mood: tearful and crying in group when we started art and music activity.    Behavior: participated and was taught to release her feeling through art and creative writing.pt started to feel better.    Affect: sad and depressed missing her mother who is .    Speech: pt talking and expressing her feelings through art and music    Cognition: alert in group    Participation Level: 100% . Pt was proud of the work she completed.    Intervention:cont rt services.           Recreation Therapist   Signature: tao demarco MA CTRS

## 2024-10-08 NOTE — NURSING
"Daily Nursing Note:      Behavior:    Patient (Ashley Clayton is a 34 y.o. female, : 1990, MRN: [id number]) demonstrating an affect that was flat, anxious, irritable, and agitated. Ashley demonstrating mood that is depressed and anxious. Ashley had an appearance that was disheveled. Ashley denies suicidal ideation. Ashley denies suicide plan. Ashley denies homicidal ideation. Ashley denies hallucinations.    Ashley's  height is 5' 2" (1.575 m) and weight is 83 kg (182 lb 15.7 oz). Her oral temperature is 97.8 °F (36.6 °C). Her blood pressure is 119/83 and her pulse is 104. Her respiration is 20 and oxygen saturation is 96%.     Roberts last BM was noted on: 10/6/2024. Ashley is agitated, c/o she is hungry. Ashley is c/o of the breakfast served today and did not eat.  They were served biscuits and gravy and sausage norbert."I don't eat that, it looked like vomit."  She refused cereal. "I don't eat that kind of cereal." She refused a sandwich, "I don't eat sandwiches for breakfast." When asked what she usually ate for breakfast she replied, "Donuts and frappe."      Intervention:    Encourage Ashley to perform self-hygiene, grooming, and changing of clothing. Monitor Ashley's behavior and program compliance. Monitor Ashley for suicidal ideation, homicidal ideation, sleep disturbance, and hallucinations. Encourage Ashley to eat all portions of meals and assess for meal preferences.  Ensure hydration by offering fluids. Notify the Physician for any medication refusal and any change in patient condition.      Response:    Ashley verbalizes understand of unit process and procedures. Ashley is compliant with medications and treatment.    Plan:     Continue to monitor per MD orders; maintain patient safety. Q15min safety checks.Suicide precautions.  "

## 2024-10-08 NOTE — PLAN OF CARE
Problem: Adult Behavioral Health Plan of Care  Goal: Plan of Care Review  Outcome: Progressing  Goal: Patient-Specific Goal (Individualization)  Outcome: Progressing  Goal: Adheres to Safety Considerations for Self and Others  Outcome: Progressing  Goal: Absence of New-Onset Illness or Injury  Outcome: Progressing  Goal: Optimized Coping Skills in Response to Life Stressors  Outcome: Progressing     Problem: Excessive Substance Use  Goal: Improved Behavioral Control (Excessive Substance Use)  Outcome: Progressing  Goal: Improved Physiologic Symptoms (Excessive Substance Use)  Outcome: Progressing     Problem: Depression  Goal: Improved Mood  Outcome: Progressing     Problem: Anxiety  Goal: Anxiety Reduction or Resolution  Outcome: Progressing     Problem: Glycemic Control Impaired  Goal: Blood Glucose Level Within Targeted Range  Outcome: Progressing  Goal: Minimize Risk of Hypoglycemia  Outcome: Progressing

## 2024-10-08 NOTE — NURSING
2100. AAO and flat. Voiced no c/o. Admits to Depression=0 and Anxiety=0. Denies any SI/HI/AH/VH. Accepted all PM meds as prescribed by Provider, except for Mupirocin. In room resting quietly.

## 2024-10-08 NOTE — NURSING
"PRN Medication Follow-up Note:    Behavior:  Patient (Ashley Clayton is a 34 y.o. female, : 1990, MRN: [id number])     Allergies: Latex, natural rubber    Roberts  height is 5' 2" (1.575 m) and weight is 83 kg (182 lb 15.7 oz). Her oral temperature is 97.8 °F (36.6 °C). Her blood pressure is 119/83 and her pulse is 104. Her respiration is 20 and oxygen saturation is 96%.     Administered IBU for Cramps, per physician order to Ashley.       Intervention:  Intervention to Ashley's response: IBU 400mg PO @ 0618.       Response:  Ashley's response: Some relief @ 0648, pain=6. In dayroom with peers.      Plan:   Continue to monitor per MD/PA/APRN orders; and reevaluate medication effectiveness within 30 minutes.  "

## 2024-10-08 NOTE — PLAN OF CARE
Problem: Adult Behavioral Health Plan of Care  Goal: Plan of Care Review  10/8/2024 0819 by Roshni Curry RN  Outcome: Progressing  10/8/2024 0818 by Roshni Curry RN  Outcome: Progressing  Goal: Patient-Specific Goal (Individualization)  10/8/2024 0819 by Roshni Curry RN  Outcome: Progressing  10/8/2024 0818 by Roshni Curry RN  Outcome: Progressing  Goal: Adheres to Safety Considerations for Self and Others  10/8/2024 0819 by Roshni Curry RN  Outcome: Progressing  10/8/2024 0818 by Roshni Curry RN  Outcome: Progressing  Goal: Optimized Coping Skills in Response to Life Stressors  10/8/2024 0819 by Roshni Curry RN  Outcome: Progressing  10/8/2024 0818 by Roshni Curry RN  Outcome: Progressing     Problem: Excessive Substance Use  Goal: Improved Behavioral Control (Excessive Substance Use)  10/8/2024 0819 by Roshni Curry RN  Outcome: Progressing  10/8/2024 0818 by Roshni Curry RN  Outcome: Progressing     Problem: Depression  Goal: Improved Mood  10/8/2024 0819 by Roshni Curry RN  Outcome: Progressing  10/8/2024 0818 by Roshni Curry RN  Outcome: Progressing     Problem: Anxiety  Goal: Anxiety Reduction or Resolution  10/8/2024 0819 by Roshni Curry RN  Outcome: Progressing  10/8/2024 0818 by Roshni Curry RN  Outcome: Progressing     Problem: Adult Behavioral Health Plan of Care  Goal: Absence of New-Onset Illness or Injury  Outcome: Met     Problem: Excessive Substance Use  Goal: Improved Physiologic Symptoms (Excessive Substance Use)  Outcome: Met     Problem: Glycemic Control Impaired  Goal: Blood Glucose Level Within Targeted Range  Outcome: Met  Goal: Minimize Risk of Hypoglycemia  Outcome: Met

## 2024-10-08 NOTE — NURSING
"PRN Administration Note:    Behavior:  Patient (Ashley Clayton is a 34 y.o. female, : 1990, MRN: [id number])     Allergies: Latex, natural rubber    Ashley's  height is 5' 2" (1.575 m) and weight is 83 kg (182 lb 15.7 oz). Her oral temperature is 97.8 °F (36.6 °C). Her blood pressure is 119/83 and her pulse is 104. Her respiration is 20 and oxygen saturation is 96%.     Reason for PRN Administration: C/O Cramps. Pain=8.    Intervention:  Administered IBU 400mg PO @ 0618, per physician order to Ashley.       Response:  Ashley tolerated administration well.      Plan:   Continue to monitor per MD/PA/APRN orders; and reevaluate medication effectiveness within 30 minutes.  "

## 2024-10-08 NOTE — GROUP NOTE
Group Psychotherapy       Group Focus: Discharge Planning      Number of patients in attendance: 1    Group Start Time: 0900  Group End Time:  0930  Groups Date: 10/8/2024  Group Topic:  Behavioral Health  Group Department: [facility] - Behavioral [facility]  Group Facilitators:  Ralph Patel LCSW  _____________________________________________________________________    Patient Name: Ashley Clayton  MRN: [id number]  Patient Class: IP- Psych   Admission Date\Time: 10/4/2024 12:50 PM  Hospital Length of Stay: 4  Primary Care Provider: Farhana Osorio FNP     Referred by: Behavioral Medicine Unit Treatment Team     Target symptoms: Depression and Anxiety     Patient's response to treatment: Active Listening and Self-disclosure     Progress toward goals: Progressing slowly     Interval History: Pt focused on leaving by any means possible.  Discus the need to follow up with programming.     Diagnosis:      Plan: Continue treatment on BMU

## 2024-10-08 NOTE — PROGRESS NOTES
Recreation Therapy Progress Note    Date: 10  8  2024    Time: 10:00 am group    Group Title: creative expression    Mood: pt anxious     Behavior: prompts needed to participate in cognitive games enhancing problem solving skills    Affect: anxious and restless    Speech: pt talking more in group    Cognition: alert in group with prompts needed     Participation Level: 100% in group . Pt did pace a lot through  out group    Intervention:cont rt services.          Recreation Therapist   Signature: tao demarco MA CTRS

## 2024-10-09 PROCEDURE — 25000003 PHARM REV CODE 250: Performed by: INTERNAL MEDICINE

## 2024-10-09 PROCEDURE — 11400000 HC PSYCH PRIVATE ROOM

## 2024-10-09 PROCEDURE — 25000003 PHARM REV CODE 250: Performed by: PSYCHIATRY & NEUROLOGY

## 2024-10-09 RX ORDER — BUSPIRONE HYDROCHLORIDE 10 MG/1
10 TABLET ORAL 3 TIMES DAILY
Qty: 90 TABLET | Refills: 1 | Status: SHIPPED | OUTPATIENT
Start: 2024-10-09 | End: 2024-12-08

## 2024-10-09 RX ORDER — BUPROPION HYDROCHLORIDE 300 MG/1
300 TABLET ORAL DAILY
Qty: 30 TABLET | Refills: 0 | Status: SHIPPED | OUTPATIENT
Start: 2024-10-09 | End: 2024-11-08

## 2024-10-09 RX ORDER — CETIRIZINE HYDROCHLORIDE 10 MG/1
10 TABLET ORAL DAILY
Qty: 30 TABLET | Refills: 0 | Status: SHIPPED | OUTPATIENT
Start: 2024-10-10 | End: 2024-11-09

## 2024-10-09 RX ORDER — BUPROPION HYDROCHLORIDE 300 MG/1
300 TABLET ORAL DAILY
Qty: 30 TABLET | Refills: 0 | Status: SHIPPED | OUTPATIENT
Start: 2024-10-09 | End: 2024-10-09

## 2024-10-09 RX ORDER — PRAZOSIN HYDROCHLORIDE 2 MG/1
2 CAPSULE ORAL NIGHTLY
Qty: 30 CAPSULE | Refills: 0 | Status: SHIPPED | OUTPATIENT
Start: 2024-10-09 | End: 2024-11-08

## 2024-10-09 RX ADMIN — MUPIROCIN 1 G: 20 OINTMENT TOPICAL at 08:10

## 2024-10-09 RX ADMIN — BUSPIRONE HYDROCHLORIDE 10 MG: 10 TABLET ORAL at 08:10

## 2024-10-09 RX ADMIN — LORAZEPAM 0.5 MG: 0.5 TABLET ORAL at 08:10

## 2024-10-09 RX ADMIN — CETIRIZINE HYDROCHLORIDE 10 MG: 10 TABLET, FILM COATED ORAL at 08:10

## 2024-10-09 RX ADMIN — PANTOPRAZOLE SODIUM 40 MG: 40 TABLET, DELAYED RELEASE ORAL at 08:10

## 2024-10-09 RX ADMIN — BUPROPION HYDROCHLORIDE 300 MG: 300 TABLET, FILM COATED, EXTENDED RELEASE ORAL at 08:10

## 2024-10-09 RX ADMIN — THYROID, PORCINE 30 MG: 30 TABLET ORAL at 06:10

## 2024-10-09 RX ADMIN — PRAZOSIN HYDROCHLORIDE 2 MG: 1 CAPSULE ORAL at 08:10

## 2024-10-09 RX ADMIN — IBUPROFEN 400 MG: 400 TABLET, FILM COATED ORAL at 08:10

## 2024-10-09 RX ADMIN — BUSPIRONE HYDROCHLORIDE 10 MG: 10 TABLET ORAL at 02:10

## 2024-10-09 NOTE — PLAN OF CARE
Problem: Adult Behavioral Health Plan of Care  Goal: Plan of Care Review  Outcome: Progressing  Goal: Patient-Specific Goal (Individualization)  Outcome: Progressing  Goal: Adheres to Safety Considerations for Self and Others  Outcome: Progressing  Goal: Optimized Coping Skills in Response to Life Stressors  Outcome: Progressing     Problem: Excessive Substance Use  Goal: Improved Behavioral Control (Excessive Substance Use)  Outcome: Progressing     Problem: Depression  Goal: Improved Mood  Outcome: Met     Problem: Anxiety  Goal: Anxiety Reduction or Resolution  Outcome: Progressing  Intervention: Promote Anxiety Reduction  Flowsheets (Taken 10/9/2024 6217)  Supportive Measures:   active listening utilized   counseling provided   goal-setting facilitated   positive reinforcement provided   verbalization of feelings encouraged

## 2024-10-09 NOTE — PROGRESS NOTES
Recreation Therapy Progress Note    Date: 10  9  2024    Time: 1400    Group Title: leisure skills    Mood: depressed and worried a lot she states    Behavior: participated in art and music activity     Affect: depressed and tearful at intervals    Speech: pt talking more this afternoon     Cognition: alert     Participation Level: 100%    Intervention:cont rt services.          Recreation Therapist   Signature: tao demarco MA CTRS

## 2024-10-09 NOTE — PLAN OF CARE
Problem: Adult Behavioral Health Plan of Care  Goal: Plan of Care Review  Outcome: Progressing  Goal: Patient-Specific Goal (Individualization)  Outcome: Progressing  Goal: Adheres to Safety Considerations for Self and Others  Outcome: Progressing  Goal: Optimized Coping Skills in Response to Life Stressors  Outcome: Progressing     Problem: Excessive Substance Use  Goal: Improved Behavioral Control (Excessive Substance Use)  Outcome: Progressing     Problem: Depression  Goal: Improved Mood  Outcome: Progressing     Problem: Anxiety  Goal: Anxiety Reduction or Resolution  Outcome: Progressing

## 2024-10-09 NOTE — NURSING
"PRN Medication Follow-up Note:    Behavior:  Patient (Ashley Clayton is a 34 y.o. female, : 1990, MRN: [id number])     Allergies: Latex, natural rubber    Ashley's  height is 5' 2" (1.575 m) and weight is 83 kg (182 lb 15.7 oz). Her oral temperature is 98.5 °F (36.9 °C). Her blood pressure is 122/86 and her pulse is 95. Her respiration is 16 and oxygen saturation is 98%.     Administered IBU for cramps, per physician order to Ashley.      Intervention:  Intervention to Ashley's response: IBU 400mg PO @ .       Response:  Ashley's response: Some relief @ , pain=4. Resting in bed quietly.      Plan:   Continue to monitor per MD/PA/APRN orders; and reevaluate medication effectiveness within 30 minutes.  "

## 2024-10-09 NOTE — NURSING
2130. AAO and flat. Voiced no c/o. Admitted to Depression=0 and Anxiety=0. Denied any SI/HI/AH/VH. Accepted all PM meds as prescribed by Provider. In bed resting quietly.

## 2024-10-09 NOTE — PROGRESS NOTES
Recreation Therapy Progress Note    Date: 10  9  2024    Time: 10:00 am group    Group Title: creative expression    Mood: depressed and tearful at intervals.      Behavior: pt needed prompts to do her assignment on emotions and problem solving skills    Affect: pt was hopeful and felt better once she completed her assignment .     Speech: pt talking more towards the middle of group the patient felt good about her completed work    Cognition: alert  and focused more with prompts from rt staff needed.    Participation Level: 100%  pt was proud of her completed art project.    Intervention:cont rt services.           Recreation Therapist   Signature: tao demarco MA CTRS
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
## 2024-10-09 NOTE — NURSING
"Daily Nursing Note:      Behavior:    Patient (Ashley Clayton is a 34 y.o. female, : 1990, MRN: [id number]) demonstrating an affect that was flat, anxious, and irritable. Ashley demonstrating mood that is depressed and anxious. Ashley had an appearance that was disheveled. Ashley denies suicidal ideation. Ashley denies suicide plan. Ashley denies homicidal ideation. Ashley denies hallucinations.    Ashley's  height is 5' 2" (1.575 m) and weight is 83 kg (182 lb 15.7 oz). Her oral temperature is 98.5 °F (36.9 °C). Her blood pressure is 122/86 and her pulse is 95. Her respiration is 16 and oxygen saturation is 98%.     Intervention:    Encourage Ashley to perform self-hygiene, grooming, and changing of clothing. Monitor Ashley's behavior and program compliance. Monitor Ashley for suicidal ideation, homicidal ideation, sleep disturbance, and hallucinations. Encourage Ashley to eat all portions of meals and assess for meal preferences. Monitor Ashley for intake and output to ensure hydration. Notify the Physician/Physician Assistant/Advance Practice Registered Nurse (MD/PA/APRN) for any medication refusal and any change in patient condition.      Response:    Ashley verbalizes understand of unit process and procedures.     Plan:     Continue to monitor per MD/PA/APRN orders; maintain patient safety.  "

## 2024-10-09 NOTE — PATIENT CARE CONFERENCE
Family session with pt and her girlfriend Shanae.  Discussed events leading up to OD and the way forward.  Shanae indicated that pt can be impulsive with behaviors and that is something she has noticed.  Also discussed the levels of support that she will need to pursue more effective problem solving.  Both agreed that more intensive treatment will be necessary for patient to deal with all of the things that have happened to her in her life and process all of the losses.  Shanae seems committed to helping her with all of that.

## 2024-10-09 NOTE — NURSING
"Daily Nursing Note:      Behavior:    Patient (Ashley Clayton is a 34 y.o. female, : 1990, MRN: [id number]) demonstrating an affect that was sad and anxious. Ashley demonstrating mood that is anxious. Ashley had an appearance that was clean. Ashley denies suicidal ideation. Ashley denies suicide plan. Ashley denies homicidal ideation. Ashley denies hallucinations.    Ashley's  height is 5' 2" (1.575 m) and weight is 83 kg (182 lb 15.7 oz). Her oral temperature is 98.4 °F (36.9 °C). Her blood pressure is 137/67 and her pulse is 99. Her respiration is 20 and oxygen saturation is 96%.     Roberts last BM was noted on: 10/8/24      Intervention:    Encourage Ashley to perform self-hygiene, grooming, and changing of clothing. Monitor Ashley's behavior and program compliance. Monitor Ashley for suicidal ideation, homicidal ideation, sleep disturbance, and hallucinations. Encourage Ashley to eat all portions of meals and assess for meal preferences. Monitor Ashley for intake and output to ensure hydration. Notify the Physician for any medication refusal and any change in patient condition.      Response:    Ashley verbalizes understand of unit process and procedures. Ashley is compliant with medications, no adverse effects observed or reported. Plans are to discharge home in the morning and follow up with [facility] in [place].       Plan:     Continue to monitor per MD orders; maintain patient safety. Q 15 min safety checks.  "

## 2024-10-09 NOTE — NURSING
"PRN Administration Note:    Behavior:  Patient (Ashley Clayton is a 34 y.o. female, : 1990, MRN: [id number])     Allergies: Latex, natural rubber    Ashley's  height is 5' 2" (1.575 m) and weight is 83 kg (182 lb 15.7 oz). Her oral temperature is 98.5 °F (36.9 °C). Her blood pressure is 122/86 and her pulse is 95. Her respiration is 16 and oxygen saturation is 98%.     Reason for PRN Administration: C/O Cramps, pain=5.    Intervention:  Administered IBU 400mg PO @ , per physician order to Ashley.      Response:  Ashley tolerated administration well.      Plan:   Continue to monitor per MD/PA/APRN orders; and reevaluate medication effectiveness within 30 minutes.  "

## 2024-10-10 VITALS
SYSTOLIC BLOOD PRESSURE: 119 MMHG | OXYGEN SATURATION: 95 % | DIASTOLIC BLOOD PRESSURE: 76 MMHG | HEIGHT: 62 IN | HEART RATE: 87 BPM | BODY MASS INDEX: 33.68 KG/M2 | WEIGHT: 183 LBS | TEMPERATURE: 98 F | RESPIRATION RATE: 16 BRPM

## 2024-10-10 PROCEDURE — 25000003 PHARM REV CODE 250: Performed by: PSYCHIATRY & NEUROLOGY

## 2024-10-10 PROCEDURE — 25000003 PHARM REV CODE 250: Performed by: INTERNAL MEDICINE

## 2024-10-10 RX ADMIN — MUPIROCIN 1 G: 20 OINTMENT TOPICAL at 07:10

## 2024-10-10 RX ADMIN — CETIRIZINE HYDROCHLORIDE 10 MG: 10 TABLET, FILM COATED ORAL at 07:10

## 2024-10-10 RX ADMIN — BUSPIRONE HYDROCHLORIDE 10 MG: 10 TABLET ORAL at 07:10

## 2024-10-10 RX ADMIN — THYROID, PORCINE 30 MG: 30 TABLET ORAL at 06:10

## 2024-10-10 RX ADMIN — BUPROPION HYDROCHLORIDE 300 MG: 300 TABLET, FILM COATED, EXTENDED RELEASE ORAL at 07:10

## 2024-10-10 RX ADMIN — PANTOPRAZOLE SODIUM 40 MG: 40 TABLET, DELAYED RELEASE ORAL at 07:10
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
## 2024-10-10 NOTE — NURSING
"Daily Nursing Note:      Behavior:  Patient (Ashley Clayton is a 34 y.o. female, : 1990, MRN: [id number]) demonstrating an affect that was anxious. Ashley demonstrating mood that is anxious. Ashley had an appearance that was clean. Ashley denies suicidal ideation. Ashley denies suicide plan. Ashley denies homicidal ideation. Ashley denies hallucinations.    Ashley's  height is 5' 2" (1.575 m) and weight is 83 kg (182 lb 15.7 oz). Her oral temperature is 98.2 °F (36.8 °C). Her blood pressure is 122/87 and her pulse is 76. Her respiration is 16 and oxygen saturation is 99%. Roberts last BM was noted on: 10/09/2024.      Intervention:  Encouraged Ashley to perform self-hygiene, grooming, and changing of clothing. Monitored Ashley's behavior and program compliance. Monitored Ashley for suicidal ideation, homicidal ideation, sleep disturbance, and hallucinations. Encouraged Ashley to eat all portions of meals and assesse for meal preferences. Monitored Ashley for intake and output to ensure hydration. Will notify the Physician for any medication refusal and any change in patient condition.      Response:  Ashley reports anxiety, rating it 3/10, "cause I'm anxious to go to sleep so it can be tomorrow already and I can go home. Denies depression. Also denies any thoughts of wanting to harm herself or anyone else.       Plan:   Continue to monitor per MD orders; maintain patient safety with safety checks Q15 minutes and prn.   "

## 2024-10-10 NOTE — ASSESSMENT & PLAN NOTE
1. Patient was returned to baseline.  2.  We will maintain current medications he was documented in the medication reconciliation in his patient was does appear to be getting good efficacious response to current dosing threshold.

## 2024-10-10 NOTE — ASSESSMENT & PLAN NOTE
1. Patient was tolerated institutional trials of Minipress 2 mg p.o. q.h.s..  Sleep is improved in the frequency intensity overall traumatic recall his lessened.  2.  Patient was this time does show evidence of improvement in mood and functioning such at this time overall issues in terms of posttraumatic stress disorder that appears to be fairly chronic we will need to be addressed in outpatient psychotherapy.   will ensure that patient was has a referral to the level of care.

## 2024-10-10 NOTE — NURSING
Discharge Note:    Ashley Clayton is a 34 y.o. female, : 1990, MRN: [id number], admitted on 10/4/2024 for Darryl Swartz MD with a diagnosis of Depression with suicidal ideation [F32.A, R45.851]  Depression with suicidal ideation [F32.A, R45.851].    Patient discharged home via Aunt on 10/10/2024 per physician orders in stable condition. Patient denied suicidal ideation, homicidal ideation, or hallucinations.  Patient medication compliant with no c/o side effects.  Patient was discharged with valuables, personal belongings, prescriptions, discharge instructions, and an educational handout explaining the diagnosis and prescribed medications. Patient verbalized understanding of the discharge instructions and importance of follow-up visits. Patient was escorted out of the facility by [facility].       Patient discharged on the following medications:     Medication List        START taking these medications      cetirizine 10 MG tablet  Commonly known as: ZYRTEC  Take 1 tablet (10 mg total) by mouth once daily.     prazosin 2 MG Cap  Commonly known as: MINIPRESS  Take 1 capsule (2 mg total) by mouth every evening.            CHANGE how you take these medications      busPIRone 10 MG tablet  Commonly known as: BUSPAR  Take 1 tablet (10 mg total) by mouth 3 (three) times daily.  What changed: when to take this            CONTINUE taking these medications      ABILIFY MAINTENA 400 mg Sers injection (pre-filled dual chamber)  Generic drug: ARIPiprazole     buPROPion 300 MG 24 hr tablet  Commonly known as: WELLBUTRIN XL  Take 1 tablet (300 mg total) by mouth once daily.     NP THYROID 30 mg Tab  Generic drug: thyroid (pork)     pantoprazole 40 MG tablet  Commonly known as: PROTONIX            STOP taking these medications      atomoxetine 40 MG capsule  Commonly known as: STRATTERA     ibuprofen 600 MG tablet  Commonly known as: ADVIL,MOTRIN     levocetirizine 5 MG tablet  Commonly known as: XYZAL     metFORMIN  500 MG ER 24hr tablet  Commonly known as: GLUCOPHAGE-XR     tiZANidine 4 MG tablet  Commonly known as: ZANAFLEX               Where to Get Your Medications        These medications were sent to [id number] - [place] - [address]  [address] [address]      Phone: [phone]   buPROPion 300 MG 24 hr tablet  busPIRone 10 MG tablet  cetirizine 10 MG tablet  prazosin 2 MG Cap

## 2024-10-10 NOTE — PLAN OF CARE
Problem: Excessive Substance Use  Goal: Improved Behavioral Control (Excessive Substance Use)  Outcome: Met     Problem: Excessive Substance Use  Goal: Improved Behavioral Control (Excessive Substance Use)  Outcome: Met     Problem: Anxiety  Goal: Anxiety Reduction or Resolution  Outcome: Met  Intervention: Promote Anxiety Reduction  Flowsheets (Taken 10/10/2024 7749)  Supportive Measures:   active listening utilized   verbalization of feelings encouraged   self-care encouraged   positive reinforcement provided

## 2024-10-10 NOTE — ASSESSMENT & PLAN NOTE
1. Patient was at this time has given consent to follow up in the day hospitalization program with the ongoing treatment management for underlying substance use problems.

## 2024-10-10 NOTE — HOSPITAL COURSE
Date of discharge: 10/10/2024.      34-year-old white female who at this time presented to [facility] through this emergency room with evidence of active ongoing statements intention to end her life with gestures to do so.  Patient was this time with the most recent difficulties in terms of interpersonal relationship with a significant other and chronic pattern of depression, impulse as well as complicated by substance use disorder particularly methamphetamines and cannabis.      Patient was admitted to inpatient setting such underwent full physical assessment this time.  On physical evaluation patient was noted at this time not to have any acute changes physical findings.  She was mildly obese.      Patient was went full laboratory assessments this time.  Patient was went full assessment such at the time of evaluation showed no significant abnormalities on CBC were CMP.  Urine toxicology screen was significant for the presence of cannabis only otherwise he was not remarkable.      Patient was went full psychiatric assessment by this provider.  Recommendations made for patient was to be initiated back on routine maintenance medications including Abilify Maintena, BuSpar, and Effexor XR.  Patient was maintained on maintenance doses medications during her stay.  Patient was assessed by this provider and clearly has a issues in which she was previously has been diagnosed as to be in the bipolar spectrum but appeared more likely than not has a strong overlay trauma of constant mood disturbance.  This is also been complicated by her chronic pattern which he was use methamphetamines.    Patient was rechallenge with trials of medications.  He was particularly difficulties in which she was describes ongoing challenges in terms of failed asleep she was placed on trials of Minipress to try to decrease traumatic dream material to help mitigate difficulties in terms of poor sleep.  Patient was tolerated institutional  that medication trials and those doses were advanced to 2 mg p.o. q.h.s..    Patient was has a relatively unremarkable hospitalization.  She was showed evidence of rapid stabilization improvement in mood reductions any thoughts to self injure.   did have session with family members as well as with the patient was help try to develop improved understanding as well as coping strategies when feeling suicidal, anxious overwhelmed.      Patient was acknowledged.    Patient was overall courses unremarkable after being confined for duration lessened week he was quite clear that she was maximized benefit could safely be discharged to outpatient level of care.  At the time of discharge he was not homicidal, suicide in the gravely disabled.

## 2024-10-11 LAB — POCT GLUCOSE: 140 MG/DL (ref 70–110)

## 2024-11-15 ENCOUNTER — HOSPITAL ENCOUNTER (EMERGENCY)
Facility: HOSPITAL | Age: 34
Discharge: HOME | End: 2024-11-15
Attending: INTERNAL MEDICINE
Payer: MEDICAID

## 2024-11-15 VITALS
BODY MASS INDEX: 34.04 KG/M2 | RESPIRATION RATE: 17 BRPM | SYSTOLIC BLOOD PRESSURE: 121 MMHG | HEART RATE: 89 BPM | WEIGHT: 185 LBS | TEMPERATURE: 98 F | DIASTOLIC BLOOD PRESSURE: 84 MMHG | HEIGHT: 62 IN | OXYGEN SATURATION: 97 %

## 2024-11-15 DIAGNOSIS — K59.00 CONSTIPATION, UNSPECIFIED CONSTIPATION TYPE: Primary | ICD-10-CM

## 2024-11-15 PROCEDURE — 99284 EMERGENCY DEPT VISIT MOD MDM: CPT

## 2024-11-15 RX ORDER — SYRING-NEEDL,DISP,INSUL,0.3 ML 29 G X1/2"
296 SYRINGE, EMPTY DISPOSABLE MISCELLANEOUS ONCE
Qty: 1 EACH | Refills: 0 | Status: SHIPPED | OUTPATIENT
Start: 2024-11-15 | End: 2024-11-15

## 2024-11-15 RX ORDER — POLYETHYLENE GLYCOL 3350 17 G/17G
17 POWDER, FOR SOLUTION ORAL DAILY
Qty: 238 G | Refills: 0 | Status: SHIPPED | OUTPATIENT
Start: 2024-11-15
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
## 2024-11-15 NOTE — Clinical Note
"Ashley Donahue" Forrest was seen and treated in our emergency department on 11/15/2024.  She may return to work on 11/15/2024.       If you have any questions or concerns, please don't hesitate to call.       RN    "

## 2024-12-15 ENCOUNTER — HOSPITAL ENCOUNTER (EMERGENCY)
Facility: HOSPITAL | Age: 34
Discharge: HOME | End: 2024-12-15
Attending: INTERNAL MEDICINE
Payer: MEDICAID

## 2024-12-15 VITALS
OXYGEN SATURATION: 99 % | HEIGHT: 64 IN | RESPIRATION RATE: 16 BRPM | SYSTOLIC BLOOD PRESSURE: 119 MMHG | WEIGHT: 188 LBS | TEMPERATURE: 98 F | DIASTOLIC BLOOD PRESSURE: 71 MMHG | BODY MASS INDEX: 32.1 KG/M2 | HEART RATE: 90 BPM

## 2024-12-15 DIAGNOSIS — M47.816 LUMBAR SPONDYLOSIS: ICD-10-CM

## 2024-12-15 DIAGNOSIS — M54.32 SCIATICA OF LEFT SIDE: Primary | ICD-10-CM

## 2024-12-15 LAB
B-HCG UR QL: NEGATIVE
BILIRUB UR QL STRIP.AUTO: NEGATIVE
CLARITY UR: CLEAR
COLOR UR AUTO: YELLOW
GLUCOSE UR QL STRIP: NEGATIVE
HGB UR QL STRIP: NEGATIVE
KETONES UR QL STRIP: NEGATIVE
LEUKOCYTE ESTERASE UR QL STRIP: NEGATIVE
NITRITE UR QL STRIP: NEGATIVE
PH UR STRIP: 6 [PH]
PROT UR QL STRIP: NEGATIVE
SP GR UR STRIP.AUTO: 1.02 (ref 1–1.03)
UROBILINOGEN UR STRIP-ACNC: 0.2

## 2024-12-15 PROCEDURE — 99284 EMERGENCY DEPT VISIT MOD MDM: CPT | Mod: 25

## 2024-12-15 PROCEDURE — 96372 THER/PROPH/DIAG INJ SC/IM: CPT | Performed by: INTERNAL MEDICINE

## 2024-12-15 PROCEDURE — 81003 URINALYSIS AUTO W/O SCOPE: CPT | Performed by: INTERNAL MEDICINE

## 2024-12-15 PROCEDURE — 81025 URINE PREGNANCY TEST: CPT | Performed by: INTERNAL MEDICINE

## 2024-12-15 PROCEDURE — 63600175 PHARM REV CODE 636 W HCPCS: Performed by: INTERNAL MEDICINE

## 2024-12-15 RX ORDER — NAPROXEN 500 MG/1
500 TABLET ORAL 2 TIMES DAILY WITH MEALS
Qty: 30 TABLET | Refills: 0 | Status: SHIPPED | OUTPATIENT
Start: 2024-12-15 | End: 2024-12-30

## 2024-12-15 RX ORDER — KETOROLAC TROMETHAMINE 30 MG/ML
30 INJECTION, SOLUTION INTRAMUSCULAR; INTRAVENOUS
Status: COMPLETED | OUTPATIENT
Start: 2024-12-15 | End: 2024-12-15

## 2024-12-15 RX ORDER — TIZANIDINE 4 MG/1
4 TABLET ORAL EVERY 6 HOURS PRN
Qty: 30 TABLET | Refills: 0 | Status: SHIPPED | OUTPATIENT
Start: 2024-12-15 | End: 2024-12-25

## 2024-12-15 RX ORDER — DEXAMETHASONE SODIUM PHOSPHATE 4 MG/ML
8 INJECTION, SOLUTION INTRA-ARTICULAR; INTRALESIONAL; INTRAMUSCULAR; INTRAVENOUS; SOFT TISSUE
Status: COMPLETED | OUTPATIENT
Start: 2024-12-15 | End: 2024-12-15

## 2024-12-15 RX ADMIN — KETOROLAC TROMETHAMINE 30 MG: 30 INJECTION, SOLUTION INTRAMUSCULAR; INTRAVENOUS at 12:12

## 2024-12-15 RX ADMIN — DEXAMETHASONE SODIUM PHOSPHATE 8 MG: 4 INJECTION, SOLUTION INTRA-ARTICULAR; INTRALESIONAL; INTRAMUSCULAR; INTRAVENOUS; SOFT TISSUE at 12:12

## 2024-12-15 NOTE — DISCHARGE INSTRUCTIONS
Take medicines as prescribed    See your family doctor in one to 2 days for further evaluation, workup, and treatment as necessary    Avoid driving or operating machinery while taking medicines as some medicines might cause drowsiness and may cause problems. Also pain medicines have potential of being addictive  so use Pain meds specially Narcotics Sparingly.    The exam and treatment you received in Emergency Room was for an urgent problem and NOT INTENDED AS COMPLETE CARE. It is important that you FOLLOW UP with a doctor for ongoing care. If your symptoms become WORSE or you DO NOT IMPROVE and you are unable to reach your health care provider, you should RETURN to the emergency department. The Emergency Room doctor has provided a PRELIMINARY INTERPRETATION of all your STUDIES. A final interpretation may be done after you are discharged. IF A CHANGE in your diagnosis or treatment is needed WE WILL CONTACT YOU. It is critical that we have a CURRENT PHONE NUMBER FOR YOU.

## 2024-12-15 NOTE — ED PROVIDER NOTES
Encounter Date: 12/15/2024  History from patient     History     Chief Complaint   Patient presents with    Back Pain     C/o lower back pain and spasms radiating down left leg x 2 days. Pt states she has been having back issues for years     HPI    Ashley Clayton is 34 y.o. female who  has a past medical history of Anxiety disorder, unspecified, Bipolar disorder, unspecified, Depression, History of prediabetes, Schizophrenia, unspecified, Sleep difficulties, and Substance abuse. arrives in ER with c/o Back Pain (C/o lower back pain and spasms radiating down left leg x 2 days. Pt states she has been having back issues for years)    Review of patient's allergies indicates:   Allergen Reactions    Latex, natural rubber Rash     Past Medical History:   Diagnosis Date    Anxiety disorder, unspecified     Bipolar disorder, unspecified     Depression     History of prediabetes     Schizophrenia, unspecified     Sleep difficulties     Substance abuse      Past Surgical History:   Procedure Laterality Date    CHOLECYSTECTOMY      ECTOPIC PREGNANCY SURGERY Left     Salpingectomy    TONSILLECTOMY       Family History   Problem Relation Name Age of Onset    Atrial fibrillation Father       Social History     Tobacco Use    Smoking status: Every Day     Current packs/day: 0.50     Types: Cigarettes    Smokeless tobacco: Never   Substance Use Topics    Alcohol use: Yes     Comment: occasionally    Drug use: Yes     Types: Methamphetamines, Cocaine, Marijuana     Review of Systems   Constitutional:  Negative for fever.   HENT:  Negative for trouble swallowing and voice change.    Eyes:  Negative for visual disturbance.   Respiratory:  Negative for cough and shortness of breath.    Cardiovascular:  Negative for chest pain.   Gastrointestinal:  Negative for abdominal pain, diarrhea and vomiting.   Genitourinary:  Negative for dysuria and hematuria.   Musculoskeletal:  Positive for back pain. Negative for gait problem.        Back pain  going Down the left leg for years   Skin:  Negative for color change and rash.   Neurological:  Negative for headaches.   Psychiatric/Behavioral:  Negative for behavioral problems and sleep disturbance.    All other systems reviewed and are negative.      Physical Exam     Initial Vitals [12/15/24 1031]   BP Pulse Resp Temp SpO2   125/74 105 18 98.5 °F (36.9 °C) 97 %      MAP       --         Physical Exam    Nursing note and vitals reviewed.  Constitutional: No distress.   HENT:   Head: Atraumatic.   Cardiovascular:  Normal rate and regular rhythm.           Pulmonary/Chest: Breath sounds normal. No respiratory distress.   Abdominal: Abdomen is soft. Bowel sounds are normal.   Musculoskeletal:         General: Normal range of motion.      Cervical back: Normal.      Thoracic back: Normal.      Lumbar back: Normal.      Comments: Denies any tenderness in the back anywhere, she says the pain is deeper.     Neurological: She is alert and oriented to person, place, and time.   Skin: Skin is warm and dry.   Psychiatric: She has a normal mood and affect.         ED Course   Procedures  Orders Placed This Encounter    X-Ray Lumbar Spine Ap And Lateral    HCG Qualitative Urine    Urinalysis, Reflex to Urine Culture    ketorolac injection 30 mg    dexAMETHasone injection 8 mg    naproxen (NAPROSYN) 500 MG tablet    tiZANidine (ZANAFLEX) 4 MG tablet       Labs Reviewed   HCG QUALITATIVE URINE - Normal       Result Value    hCG Qualitative, Urine Negative     URINALYSIS, REFLEX TO URINE CULTURE - Normal    Color, UA Yellow      Appearance, UA Clear      Specific Gravity, UA 1.025      pH, UA 6.0      Protein, UA Negative      Glucose, UA Negative      Ketones, UA Negative      Blood, UA Negative      Bilirubin, UA Negative      Urobilinogen, UA 0.2      Nitrites, UA Negative      Leukocyte Esterase, UA Negative            Imaging Results              X-Ray Lumbar Spine Ap And Lateral (Final result)  Result time 12/15/24  13:22:57      Final result by Andres Peralta MD (12/15/24 13:22:57)                   Impression:      Spondylotic change without fracture.      Electronically signed by: Andres Peralta MD  Date:    12/15/2024  Time:    13:22               Narrative:    EXAMINATION:  XR LUMBAR SPINE AP AND LATERAL    CLINICAL HISTORY:  Back pain with sciatica;    TECHNIQUE:  AP, lateral and spot images were performed of the lumbar spine.    COMPARISON:  None    FINDINGS:  Four lumbar type vertebral bodies with transitional anatomy at L5.  The alignment curvature lumbar spine is normal.  The vertebral heights are maintained as are the intervertebral disc spaces.  No evidence for compression deformity, fracture, or subluxation.  Progressive degenerative changes of facets are identified.    The bilateral SI joints are normal.                                       Medications   ketorolac injection 30 mg (30 mg Intramuscular Given 12/15/24 1243)   dexAMETHasone injection 8 mg (8 mg Intramuscular Given 12/15/24 1243)     Medical Decision Making    Ashley Clayton is 34 y.o. female who  has a past medical history of Anxiety disorder, unspecified, Bipolar disorder, unspecified, Depression, History of prediabetes, Schizophrenia, unspecified, Sleep difficulties, and Substance abuse. arrives in ER with c/o Back Pain (C/o lower back pain and spasms radiating down left leg x 2 days. Pt states she has been having back issues for years)    Patient says that she has been having back pain for the last 10 years it going down the left leg, she comes to the emergency room on a regular basis but nobody has ever been able to find anything in there.    I advised her that I will do the urine test and back x-ray but she must go and see her family doctor to possibly do an MRI of the back for further workup as needed.    Amount and/or Complexity of Data Reviewed  Radiology: ordered.    Risk  Prescription drug management.               ED Course as of 12/15/24 4351    Sun Dec 15, 2024   1328 Advise her to see family doctor for further workup, on the x-ray she does look like she has a spondylosis, I am sending her naproxen and tizanidine to help with the pain. [GQ]      ED Course User Index  [GQ] Sarah Belcher MD                           Clinical Impression:  Final diagnoses:  [M54.32] Sciatica of left side (Primary)  [M47.816] Lumbar spondylosis          ED Disposition Condition    Discharge Stable          ED Prescriptions       Medication Sig Dispense Start Date End Date Auth. Provider    naproxen (NAPROSYN) 500 MG tablet Take 1 tablet (500 mg total) by mouth 2 (two) times daily with meals. for 15 days 30 tablet 12/15/2024 12/30/2024 Sarah Belcher MD    tiZANidine (ZANAFLEX) 4 MG tablet Take 1 tablet (4 mg total) by mouth every 6 (six) hours as needed. 30 tablet 12/15/2024 12/25/2024 Sarah Belcher MD          Follow-up Information       Follow up With Specialties Details Why Contact Info    Farhana Osorio, FNP Family Medicine, Emergency Medicine In 2 days  [address] [place] [address]  [phone]               Sarah Belcher MD  12/15/24 4984

## 2025-04-05 ENCOUNTER — HOSPITAL ENCOUNTER (EMERGENCY)
Facility: HOSPITAL | Age: 35
Discharge: HOME | End: 2025-04-06
Attending: OTOLARYNGOLOGY
Payer: MEDICAID

## 2025-04-05 DIAGNOSIS — R06.02 SOB (SHORTNESS OF BREATH): ICD-10-CM

## 2025-04-05 DIAGNOSIS — J44.1 COPD EXACERBATION: Primary | ICD-10-CM

## 2025-04-05 DIAGNOSIS — R06.02 SHORTNESS OF BREATH: ICD-10-CM

## 2025-04-05 DIAGNOSIS — R07.9 CHEST PAIN: ICD-10-CM

## 2025-04-05 DIAGNOSIS — J44.1 COPD WITH ACUTE EXACERBATION: ICD-10-CM

## 2025-04-05 DIAGNOSIS — J45.909 ASTHMA, UNSPECIFIED ASTHMA SEVERITY, UNSPECIFIED WHETHER COMPLICATED, UNSPECIFIED WHETHER PERSISTENT: ICD-10-CM

## 2025-04-05 PROCEDURE — 99285 EMERGENCY DEPT VISIT HI MDM: CPT | Mod: 25

## 2025-04-05 PROCEDURE — 93010 ELECTROCARDIOGRAM REPORT: CPT | Mod: ,,, | Performed by: INTERNAL MEDICINE

## 2025-04-05 PROCEDURE — 93005 ELECTROCARDIOGRAM TRACING: CPT

## 2025-04-05 NOTE — Clinical Note
"Ashley Donahue" Forrest was seen and treated in our emergency department on 4/5/2025.  She may return to work on 04/06/2025.       If you have any questions or concerns, please don't hesitate to call.       RN    "

## 2025-04-06 VITALS
SYSTOLIC BLOOD PRESSURE: 135 MMHG | OXYGEN SATURATION: 97 % | HEIGHT: 64 IN | RESPIRATION RATE: 18 BRPM | TEMPERATURE: 98 F | HEART RATE: 95 BPM | DIASTOLIC BLOOD PRESSURE: 79 MMHG | BODY MASS INDEX: 32.95 KG/M2 | WEIGHT: 193 LBS

## 2025-04-06 LAB
ALBUMIN SERPL-MCNC: 3.8 G/DL (ref 3.4–5)
ALBUMIN/GLOB SERPL: 2 RATIO
ALP SERPL-CCNC: 65 UNIT/L (ref 50–144)
ALT SERPL-CCNC: 24 UNIT/L (ref 1–45)
ANION GAP SERPL CALC-SCNC: 7 MEQ/L (ref 2–13)
AST SERPL-CCNC: 21 UNIT/L (ref 14–36)
BASOPHILS # BLD AUTO: 0.03 X10(3)/MCL (ref 0.01–0.08)
BASOPHILS NFR BLD AUTO: 0.4 % (ref 0.1–1.2)
BILIRUB SERPL-MCNC: 0.3 MG/DL (ref 0–1)
BNP BLD-MCNC: <20 PG/ML (ref 0–124.9)
BUN SERPL-MCNC: 19 MG/DL (ref 7–20)
CALCIUM SERPL-MCNC: 8.6 MG/DL (ref 8.4–10.2)
CHLORIDE SERPL-SCNC: 106 MMOL/L (ref 98–110)
CO2 SERPL-SCNC: 24 MMOL/L (ref 21–32)
CREAT SERPL-MCNC: 0.91 MG/DL (ref 0.66–1.25)
CREAT/UREA NIT SERPL: 21 (ref 12–20)
D DIMER PPP IA.FEU-MCNC: 0.22 MG/L FEU (ref 0.19–0.5)
EOSINOPHIL # BLD AUTO: 0.32 X10(3)/MCL (ref 0.04–0.36)
EOSINOPHIL NFR BLD AUTO: 4 % (ref 0.7–7)
ERYTHROCYTE [DISTWIDTH] IN BLOOD BY AUTOMATED COUNT: 12.7 % (ref 11–14.5)
GFR SERPLBLD CREATININE-BSD FMLA CKD-EPI: 86 ML/MIN/1.73/M2
GLOBULIN SER-MCNC: 1.9 GM/DL (ref 2–3.9)
GLUCOSE SERPL-MCNC: 106 MG/DL (ref 70–115)
HCT VFR BLD AUTO: 39.4 % (ref 36–48)
HGB BLD-MCNC: 14 G/DL (ref 11.8–16)
IMM GRANULOCYTES # BLD AUTO: 0.02 X10(3)/MCL (ref 0–0.03)
IMM GRANULOCYTES NFR BLD AUTO: 0.3 % (ref 0–0.5)
LYMPHOCYTES # BLD AUTO: 2.31 X10(3)/MCL (ref 1.16–3.74)
LYMPHOCYTES NFR BLD AUTO: 29.2 % (ref 20–55)
MCH RBC QN AUTO: 33.5 PG (ref 27–34)
MCHC RBC AUTO-ENTMCNC: 35.5 G/DL (ref 31–37)
MCV RBC AUTO: 94.3 FL (ref 79–99)
MONOCYTES # BLD AUTO: 0.7 X10(3)/MCL (ref 0.24–0.36)
MONOCYTES NFR BLD AUTO: 8.8 % (ref 4.7–12.5)
NEUTROPHILS # BLD AUTO: 4.53 X10(3)/MCL (ref 1.56–6.13)
NEUTROPHILS NFR BLD AUTO: 57.3 % (ref 37–73)
NRBC BLD AUTO-RTO: 0 %
PLATELET # BLD AUTO: 211 X10(3)/MCL (ref 140–371)
PMV BLD AUTO: 10.1 FL (ref 9.4–12.4)
POTASSIUM SERPL-SCNC: 3.6 MMOL/L (ref 3.5–5.1)
PROT SERPL-MCNC: 5.7 GM/DL (ref 6.3–8.2)
RBC # BLD AUTO: 4.18 X10(6)/MCL (ref 4–5.1)
SODIUM SERPL-SCNC: 137 MMOL/L (ref 136–145)
TROPONIN I SERPL-MCNC: <0.012 NG/ML (ref 0–0.03)
WBC # BLD AUTO: 7.91 X10(3)/MCL (ref 4–11.5)

## 2025-04-06 PROCEDURE — 94760 N-INVAS EAR/PLS OXIMETRY 1: CPT

## 2025-04-06 PROCEDURE — 83880 ASSAY OF NATRIURETIC PEPTIDE: CPT | Performed by: OTOLARYNGOLOGY

## 2025-04-06 PROCEDURE — 85379 FIBRIN DEGRADATION QUANT: CPT | Performed by: OTOLARYNGOLOGY

## 2025-04-06 PROCEDURE — 63600175 PHARM REV CODE 636 W HCPCS: Performed by: OTOLARYNGOLOGY

## 2025-04-06 PROCEDURE — 84484 ASSAY OF TROPONIN QUANT: CPT | Performed by: OTOLARYNGOLOGY

## 2025-04-06 PROCEDURE — 25000242 PHARM REV CODE 250 ALT 637 W/ HCPCS: Performed by: OTOLARYNGOLOGY

## 2025-04-06 PROCEDURE — 80053 COMPREHEN METABOLIC PANEL: CPT | Performed by: OTOLARYNGOLOGY

## 2025-04-06 PROCEDURE — 94640 AIRWAY INHALATION TREATMENT: CPT

## 2025-04-06 PROCEDURE — 96372 THER/PROPH/DIAG INJ SC/IM: CPT | Performed by: OTOLARYNGOLOGY

## 2025-04-06 PROCEDURE — 85025 COMPLETE CBC W/AUTO DIFF WBC: CPT | Performed by: OTOLARYNGOLOGY

## 2025-04-06 RX ORDER — IPRATROPIUM BROMIDE AND ALBUTEROL SULFATE 2.5; .5 MG/3ML; MG/3ML
3 SOLUTION RESPIRATORY (INHALATION)
Status: COMPLETED | OUTPATIENT
Start: 2025-04-06 | End: 2025-04-06

## 2025-04-06 RX ORDER — ASPIRIN 325 MG
325 TABLET ORAL
Status: DISCONTINUED | OUTPATIENT
Start: 2025-04-06 | End: 2025-04-06 | Stop reason: HOSPADM

## 2025-04-06 RX ORDER — METHYLPREDNISOLONE SOD SUCC 125 MG
125 VIAL (EA) INJECTION
Status: DISCONTINUED | OUTPATIENT
Start: 2025-04-06 | End: 2025-04-06 | Stop reason: HOSPADM

## 2025-04-06 RX ORDER — DEXAMETHASONE SODIUM PHOSPHATE 4 MG/ML
8 INJECTION, SOLUTION INTRA-ARTICULAR; INTRALESIONAL; INTRAMUSCULAR; INTRAVENOUS; SOFT TISSUE
Status: COMPLETED | OUTPATIENT
Start: 2025-04-06 | End: 2025-04-06

## 2025-04-06 RX ADMIN — IPRATROPIUM BROMIDE AND ALBUTEROL SULFATE 3 ML: .5; 3 SOLUTION RESPIRATORY (INHALATION) at 12:04

## 2025-04-06 RX ADMIN — DEXAMETHASONE SODIUM PHOSPHATE 8 MG: 4 INJECTION, SOLUTION INTRA-ARTICULAR; INTRALESIONAL; INTRAMUSCULAR; INTRAVENOUS; SOFT TISSUE at 12:04

## 2025-04-06 NOTE — DISCHARGE INSTRUCTIONS
STOP SMOKING ASAP  USE INHALER Q 6-8 FOR THE NEXT 3 DAYS, THEN PRN  STAY IN A WELL VENTILATED AREA WITH NO POLLEN OR DUST  FOLLOW UP PRIMARY MD FOR INHALER RX AND WORKUP FOR ASTHMA/COPD WITH PFT'S

## 2025-04-06 NOTE — ED PROVIDER NOTES
"Encounter Date: 4/5/2025       History     Chief Complaint   Patient presents with    Shortness of Breath     Arrives with c/o waking up choking and sob. States "it feels like I can't get much air in." Denies being or being exposed to anyone sick, has chronic smokers cough.     THE PATIENT WOKE UP WITH SOB AND WHEEZING AND SMOKES 1 PPD SINCE 9 YEARS OLD. SHE IS NOT FEBRILE AND IS NOT COUGHING UP ANY MUCUS AND IS NOT CONGESTED OR SICK. SHE DENIES HISTORY OF ASTHMA OR COPD BUT HAS NOT BEEN SEEN BY ANYONE. SHE DOES NOT HAVE A NEBULIZER.        Review of patient's allergies indicates:   Allergen Reactions    Latex, natural rubber Rash     Past Medical History:   Diagnosis Date    Anxiety disorder, unspecified     Bipolar disorder, unspecified     Depression     History of prediabetes     Schizophrenia, unspecified     Sleep difficulties     Substance abuse      Past Surgical History:   Procedure Laterality Date    CHOLECYSTECTOMY      ECTOPIC PREGNANCY SURGERY Left     Salpingectomy    TONSILLECTOMY       Family History   Problem Relation Name Age of Onset    Atrial fibrillation Father       Social History[1]  Review of Systems   Constitutional: Negative.    HENT: Negative.     Eyes: Negative.    Respiratory:  Positive for cough, chest tightness, shortness of breath and wheezing. Negative for choking and stridor.    Cardiovascular: Negative.    Gastrointestinal: Negative.    Endocrine: Negative.    Genitourinary: Negative.    Musculoskeletal: Negative.    Allergic/Immunologic: Negative.    Neurological: Negative.    Hematological: Negative.    Psychiatric/Behavioral: Negative.     All other systems reviewed and are negative.      Physical Exam     Initial Vitals [04/05/25 2329]   BP Pulse Resp Temp SpO2   137/88 92 20 97.1 °F (36.2 °C) 95 %      MAP       --         Physical Exam    Nursing note and vitals reviewed.  Constitutional: She appears well-developed and well-nourished. She is not diaphoretic. No distress. "   HENT:   Head: Normocephalic and atraumatic.   Eyes: EOM are normal. Pupils are equal, round, and reactive to light.   Neck: Neck supple.   Normal range of motion.  Cardiovascular:  Normal rate, regular rhythm and normal heart sounds.           Pulmonary/Chest: No respiratory distress. She has wheezes. She has no rhonchi. She has no rales. She exhibits tenderness.   EXPIRATORY WHEEZES WITH COUGHING.   Abdominal: Abdomen is soft. Bowel sounds are normal.   Musculoskeletal:         General: Normal range of motion.      Cervical back: Normal range of motion and neck supple.     Neurological: She is alert and oriented to person, place, and time. She has normal strength. GCS score is 15. GCS eye subscore is 4. GCS verbal subscore is 5. GCS motor subscore is 6.   Skin: Skin is warm and dry.   Psychiatric: She has a normal mood and affect. Her behavior is normal. Judgment and thought content normal.         ED Course   Procedures  Labs Reviewed   COMPREHENSIVE METABOLIC PANEL - Abnormal       Result Value    Sodium 137      Potassium 3.6      Chloride 106      CO2 24      Glucose 106      Blood Urea Nitrogen 19      Creatinine 0.91      Calcium 8.6      Protein Total 5.7 (*)     Albumin 3.8      Globulin 1.9 (*)     Albumin/Globulin Ratio 2.0      Bilirubin Total 0.3      ALP 65      ALT 24      AST 21      eGFR 86      Anion Gap 7.0      BUN/Creatinine Ratio 21 (*)    CBC WITH DIFFERENTIAL - Abnormal    WBC 7.91      RBC 4.18      Hgb 14.0      Hct 39.4      MCV 94.3      MCH 33.5      MCHC 35.5      RDW 12.7      Platelet 211      MPV 10.1      Neut % 57.3      Lymph % 29.2      Mono % 8.8      Eos % 4.0      Basophil % 0.4      Lymph # 2.31      Neut # 4.53      Mono # 0.70 (*)     Eos # 0.32      Baso # 0.03      Imm Gran # 0.02      Imm Grans % 0.3      NRBC% 0.0     TROPONIN I - Normal    Troponin-I <0.012     NT-PRO NATRIURETIC PEPTIDE - Normal    ProBNP <20.0     D DIMER, QUANTITATIVE - Normal    D-Dimer 0.22      CBC W/ AUTO DIFFERENTIAL    Narrative:     The following orders were created for panel order CBC auto differential.  Procedure                               Abnormality         Status                     ---------                               -----------         ------                     CBC with Differential[[id number]]       Abnormal            Final result                 Please view results for these tests on the individual orders.     EKG Readings: (Independently Interpreted)   Initial Reading: No STEMI. Rhythm: Normal Sinus Rhythm. Ectopy: No Ectopy. ST Segments: Normal ST Segments. T Waves: Normal. Axis: Normal.       Imaging Results              X-Ray Chest AP Portable (In process)                      Medications   aspirin tablet 325 mg (325 mg Oral Not Given 4/6/25 0015)   methylPREDNISolone sodium succinate injection 125 mg (125 mg Intramuscular Not Given 4/6/25 0015)   albuterol-ipratropium 2.5 mg-0.5 mg/3 mL nebulizer solution 3 mL (3 mLs Nebulization Given 4/6/25 0015)   albuterol-ipratropium 2.5 mg-0.5 mg/3 mL nebulizer solution 3 mL (3 mLs Nebulization Given 4/6/25 0015)   dexAMETHasone injection 8 mg (8 mg Intramuscular Given 4/6/25 0021)     Medical Decision Making  ASTHMA VS COPD VS INFECTION. WORKUP IN PROGRESS    Amount and/or Complexity of Data Reviewed  Labs: ordered. Decision-making details documented in ED Course.  Radiology: ordered.    Risk  OTC drugs.  Prescription drug management.               ED Course as of 04/06/25 0101   Sun Apr 06, 2025   0030 Troponin I #2 [MM]      ED Course User Index  [MM] Gayatri Bailey MD                           Clinical Impression:  Final diagnoses:  [R06.02] Shortness of breath  [R06.02] SOB (shortness of breath)  [R07.9] Chest pain  [J44.1] COPD exacerbation (Primary)  [J44.1] COPD with acute exacerbation  [J45.909] Asthma, unspecified asthma severity, unspecified whether complicated, unspecified whether persistent          ED Disposition Condition     Discharge Stable          ED Prescriptions       Medication Sig Dispense Start Date End Date Auth. Provider    ipratropium-albuteroL (COMBIVENT)  mcg/actuation inhaler Inhale 2 puffs into the lungs every 6 (six) hours as needed for Wheezing. Rescue 1 each 4/6/2025 -- Gayatri Bailey MD          Follow-up Information       Follow up With Specialties Details Why Contact Info    Farhana Osorio, FNP Family Medicine, Emergency Medicine Schedule an appointment as soon as possible for a visit   [address] [place] [address]  [phone]                   [1]   Social History  Tobacco Use    Smoking status: Every Day     Current packs/day: 0.50     Average packs/day: 0.5 packs/day for 25.3 years (12.6 ttl pk-yrs)     Types: Cigarettes     Start date: 2000    Smokeless tobacco: Never   Substance Use Topics    Alcohol use: Yes     Comment: occasionally    Drug use: Yes     Types: Methamphetamines, Cocaine, Marijuana        Gayatri Bailey MD  04/06/25 0101

## 2025-04-07 LAB
OHS QRS DURATION: 82 MS
OHS QTC CALCULATION: 425 MS

## 2025-06-10 ENCOUNTER — HOSPITAL ENCOUNTER (OUTPATIENT)
Dept: RADIOLOGY | Facility: HOSPITAL | Age: 35
Discharge: HOME | End: 2025-06-10
Attending: NURSE PRACTITIONER
Payer: MEDICAID

## 2025-06-10 DIAGNOSIS — M54.50 LOW BACK PAIN: ICD-10-CM

## 2025-06-10 PROCEDURE — 72100 X-RAY EXAM L-S SPINE 2/3 VWS: CPT | Mod: TC

## 2025-08-15 ENCOUNTER — HOSPITAL ENCOUNTER (OUTPATIENT)
Dept: RADIOLOGY | Facility: HOSPITAL | Age: 35
Discharge: HOME | End: 2025-08-15
Payer: MEDICAID

## 2025-08-15 DIAGNOSIS — R10.9 ACUTE ABDOMINAL PAIN: ICD-10-CM

## 2025-08-15 PROCEDURE — 74019 RADEX ABDOMEN 2 VIEWS: CPT | Mod: TC

## 2025-08-18 ENCOUNTER — HOSPITAL ENCOUNTER (EMERGENCY)
Facility: HOSPITAL | Age: 35
Discharge: HOME | End: 2025-08-18
Attending: EMERGENCY MEDICINE
Payer: MEDICAID

## 2025-08-18 VITALS
BODY MASS INDEX: 32.95 KG/M2 | WEIGHT: 193 LBS | RESPIRATION RATE: 18 BRPM | HEIGHT: 64 IN | TEMPERATURE: 97 F | SYSTOLIC BLOOD PRESSURE: 115 MMHG | OXYGEN SATURATION: 96 % | HEART RATE: 87 BPM | DIASTOLIC BLOOD PRESSURE: 81 MMHG

## 2025-08-18 DIAGNOSIS — B07.0 PLANTAR WART OF RIGHT FOOT: Primary | ICD-10-CM

## 2025-08-18 PROCEDURE — 99284 EMERGENCY DEPT VISIT MOD MDM: CPT

## 2025-08-18 RX ORDER — NAPROXEN 500 MG/1
500 TABLET ORAL 2 TIMES DAILY WITH MEALS
Qty: 20 TABLET | Refills: 0 | Status: SHIPPED | OUTPATIENT
Start: 2025-08-18 | End: 2025-08-28

## 2025-08-18 RX ORDER — LIDOCAINE 30 MG/G
1 CREAM TOPICAL 3 TIMES DAILY PRN
Qty: 28.35 G | Refills: 0 | Status: SHIPPED | OUTPATIENT
Start: 2025-08-18